# Patient Record
Sex: MALE | Race: WHITE | NOT HISPANIC OR LATINO | Employment: FULL TIME | URBAN - METROPOLITAN AREA
[De-identification: names, ages, dates, MRNs, and addresses within clinical notes are randomized per-mention and may not be internally consistent; named-entity substitution may affect disease eponyms.]

---

## 2019-01-04 ENCOUNTER — OFFICE VISIT (OUTPATIENT)
Dept: URGENT CARE | Facility: CLINIC | Age: 39
End: 2019-01-04
Payer: COMMERCIAL

## 2019-01-04 VITALS
HEART RATE: 100 BPM | BODY MASS INDEX: 27.47 KG/M2 | DIASTOLIC BLOOD PRESSURE: 73 MMHG | RESPIRATION RATE: 16 BRPM | WEIGHT: 175 LBS | TEMPERATURE: 97.6 F | SYSTOLIC BLOOD PRESSURE: 119 MMHG | HEIGHT: 67 IN | OXYGEN SATURATION: 97 %

## 2019-01-04 DIAGNOSIS — J20.9 ACUTE BRONCHITIS, UNSPECIFIED ORGANISM: Primary | ICD-10-CM

## 2019-01-04 PROCEDURE — G0382 LEV 3 HOSP TYPE B ED VISIT: HCPCS | Performed by: EMERGENCY MEDICINE

## 2019-01-04 RX ORDER — PREDNISONE 10 MG/1
TABLET ORAL
Qty: 27 TABLET | Refills: 0 | Status: SHIPPED | OUTPATIENT
Start: 2019-01-04 | End: 2021-10-29

## 2019-01-04 RX ORDER — FLUOXETINE 20 MG/1
20 TABLET, FILM COATED ORAL DAILY
COMMUNITY
End: 2021-10-29

## 2019-01-04 NOTE — LETTER
January 4, 2019     Patient: Arie Ramirez   YOB: 1980   Date of Visit: 1/4/2019       To Whom it May Concern:    Arie Ramirez was seen in my clinic on 1/4/2019  Please excuse him from work on 01/04/19  He may return to work on Monday 01/07/19  If you have any questions or concerns, please don't hesitate to call           Sincerely,          Mahendra Sheppard MD        CC: No Recipients

## 2019-01-04 NOTE — PATIENT INSTRUCTIONS
ingredient - during the day, and the cough suppressant (ex  Robitussin DM or Tessalon) if needed at night only  Take Zinc 12 5 to 15 mg every 2 - 3 hrs while awake for the next few days  You may take Cold Herminio (13 3 mg of Zinc) or split a 25 mg Zinc tablet or lozenge in two or a 50 mg into four to get the proper dose  The total daily dose of Zinc should exceed 75 mg per day  You may also take a decongestant like Sudafed, unless you have hypertension or cardiac disease  Hold any NSAIDs like Ibuprofen (Advil), Naprosyn (Aleve), etc while on steroids like Medrol or Prednisone    Acute Bronchitis   AMBULATORY CARE:   Acute bronchitis  is swelling and irritation in the air passages of your lungs  This irritation may cause you to cough or have other breathing problems  Acute bronchitis often starts because of another illness, such as a cold or the flu  The illness spreads from your nose and throat to your windpipe and airways  Bronchitis is often called a chest cold  Acute bronchitis lasts about 3 to 6 weeks and is usually not a serious illness  Your cough can last for several weeks  You may have any of the following symptoms:   · A cough with sputum that may be clear, yellow, or green    · Feeling more tired than usual, and body aches    · A fever and chills    · Wheezing when you breathe    · A tight chest or pain when you breathe or cough  Seek care immediately if:   · You cough up blood  · Your lips or fingernails turn blue  · You feel like you are not getting enough air when you breathe  Contact your healthcare provider if:   · You have a fever  · Your breathing problems do not go away or get worse  · Your cough does not get better within 4 weeks  · You have questions or concerns about your condition or care  Self-care:   · Get more rest   Rest helps your body to heal  Slowly start to do more each day  Rest when you feel it is needed  · Avoid irritants in the air    Avoid chemicals, fumes, and dust  Wear a face mask if you must work around dust or fumes  Stay inside on days when air pollution levels are high  If you have allergies, stay inside when pollen counts are high  Do not use aerosol products, such as spray-on deodorant, bug spray, and hair spray  · Do not smoke or be around others who smoke  Nicotine and other chemicals in cigarettes and cigars damages the cilia that move mucus out of your lungs  Ask your healthcare provider for information if you currently smoke and need help to quit  E-cigarettes or smokeless tobacco still contain nicotine  Talk to your healthcare provider before you use these products  · Drink liquids as directed  Liquids help keep your air passages moist and help you cough up mucus  You may need to drink more liquids when you have acute bronchitis  Ask how much liquid to drink each day and which liquids are best for you  · Use a humidifier or vaporizer  Use a cool mist humidifier or a vaporizer to increase air moisture in your home  This may make it easier for you to breathe and help decrease your cough  Prevent acute bronchitis by doing the following:   · Get the vaccinations you need  Ask your healthcare provider if you should get vaccinated against the flu or pneumonia  · Prevent the spread of germs  You can decrease your risk of acute bronchitis and other illnesses by doing the following:     [facility] your hands often with soap and water  Carry germ-killing hand lotion or gel with you  You can use the lotion or gel to clean your hands when soap and water are not available  ¨ Do not touch your eyes, nose, or mouth unless you have washed your hands first     ¨ Always cover your mouth when you cough to prevent the spread of germs  It is best to cough into a tissue or your shirt sleeve instead of into your hand  Ask those around you cover their mouths when they cough  ¨ Try to avoid people who have a cold or the flu   If you are sick, stay away from others as much as possible  Medicines: Your healthcare provider may  give you any of the following:  · Ibuprofen or acetaminophen  are medicines that help lower your fever  They are available without a doctor's order  Ask your healthcare provider which medicine is right for you  Ask how much to take and how often to take it  Follow directions  These medicines can cause stomach bleeding if not taken correctly  Ibuprofen can cause kidney damage  Do not take ibuprofen if you have kidney disease, an ulcer, or allergies to aspirin  Acetaminophen can cause liver damage  Do not take more than 4,000 milligrams in 24 hours  · Decongestants  help loosen mucus in your lungs and make it easier to cough up  This can help you breathe easier  · Cough suppressants  decrease your urge to cough  If your cough produces mucus, do not take a cough suppressant unless your healthcare provider tells you to  Your healthcare provider may suggest that you take a cough suppressant at night so you can rest     · Inhalers  may be given  Your healthcare provider may give you one or more inhalers to help you breathe easier and cough less  An inhaler gives your medicine to open your airways  Ask your healthcare provider to show you how to use your inhaler correctly  Follow up with your healthcare provider as directed:  Write down questions you have so you will remember to ask them during your follow-up visits  © 2017 [address]  Information is for End User's use only and may not be sold, redistributed or otherwise used for commercial purposes  All illustrations and images included in CareNotes® are the copyrighted property of A D A M , Inc  or Duane Henao  The above information is an  only  It is not intended as medical advice for individual conditions or treatments   Talk to your doctor, nurse or pharmacist before following any medical regimen to see if it is safe and effective for you

## 2019-01-04 NOTE — PROGRESS NOTES
[facility]        NAME: Mag Anderson is a 34 y o  male  : 1980    MRN: [id number]  DATE: 2019  TIME: 4:46 PM    Assessment and Plan   Acute bronchitis, unspecified organism [J20 9]  1  Acute bronchitis, unspecified organism  predniSONE 10 mg tablet         Patient Instructions     Patient Instructions     ingredient - during the day, and the cough suppressant (ex  Robitussin DM or Tessalon) if needed at night only  Take Zinc 12 5 to 15 mg every 2 - 3 hrs while awake for the next few days  You may take Cold Herminio (13 3 mg of Zinc) or split a 25 mg Zinc tablet or lozenge in two or a 50 mg into four to get the proper dose  The total daily dose of Zinc should exceed 75 mg per day  You may also take a decongestant like Sudafed, unless you have hypertension or cardiac disease  Hold any NSAIDs like Ibuprofen (Advil), Naprosyn (Aleve), etc while on steroids like Medrol or Prednisone    Acute Bronchitis   AMBULATORY CARE:   Acute bronchitis  is swelling and irritation in the air passages of your lungs  This irritation may cause you to cough or have other breathing problems  Acute bronchitis often starts because of another illness, such as a cold or the flu  The illness spreads from your nose and throat to your windpipe and airways  Bronchitis is often called a chest cold  Acute bronchitis lasts about 3 to 6 weeks and is usually not a serious illness  Your cough can last for several weeks  You may have any of the following symptoms:   · A cough with sputum that may be clear, yellow, or green    · Feeling more tired than usual, and body aches    · A fever and chills    · Wheezing when you breathe    · A tight chest or pain when you breathe or cough  Seek care immediately if:   · You cough up blood  · Your lips or fingernails turn blue  · You feel like you are not getting enough air when you breathe  Contact your healthcare provider if:   · You have a fever      · Your breathing problems do not go away or get worse  · Your cough does not get better within 4 weeks  · You have questions or concerns about your condition or care  Self-care:   · Get more rest   Rest helps your body to heal  Slowly start to do more each day  Rest when you feel it is needed  · Avoid irritants in the air  Avoid chemicals, fumes, and dust  Wear a face mask if you must work around dust or fumes  Stay inside on days when air pollution levels are high  If you have allergies, stay inside when pollen counts are high  Do not use aerosol products, such as spray-on deodorant, bug spray, and hair spray  · Do not smoke or be around others who smoke  Nicotine and other chemicals in cigarettes and cigars damages the cilia that move mucus out of your lungs  Ask your healthcare provider for information if you currently smoke and need help to quit  E-cigarettes or smokeless tobacco still contain nicotine  Talk to your healthcare provider before you use these products  · Drink liquids as directed  Liquids help keep your air passages moist and help you cough up mucus  You may need to drink more liquids when you have acute bronchitis  Ask how much liquid to drink each day and which liquids are best for you  · Use a humidifier or vaporizer  Use a cool mist humidifier or a vaporizer to increase air moisture in your home  This may make it easier for you to breathe and help decrease your cough  Prevent acute bronchitis by doing the following:   · Get the vaccinations you need  Ask your healthcare provider if you should get vaccinated against the flu or pneumonia  · Prevent the spread of germs  You can decrease your risk of acute bronchitis and other illnesses by doing the following:     [facility] your hands often with soap and water  Carry germ-killing hand lotion or gel with you  You can use the lotion or gel to clean your hands when soap and water are not available      ¨ Do not touch your eyes, nose, or mouth unless you have washed your hands first     ¨ Always cover your mouth when you cough to prevent the spread of germs  It is best to cough into a tissue or your shirt sleeve instead of into your hand  Ask those around you cover their mouths when they cough  ¨ Try to avoid people who have a cold or the flu  If you are sick, stay away from others as much as possible  Medicines: Your healthcare provider may  give you any of the following:  · Ibuprofen or acetaminophen  are medicines that help lower your fever  They are available without a doctor's order  Ask your healthcare provider which medicine is right for you  Ask how much to take and how often to take it  Follow directions  These medicines can cause stomach bleeding if not taken correctly  Ibuprofen can cause kidney damage  Do not take ibuprofen if you have kidney disease, an ulcer, or allergies to aspirin  Acetaminophen can cause liver damage  Do not take more than 4,000 milligrams in 24 hours  · Decongestants  help loosen mucus in your lungs and make it easier to cough up  This can help you breathe easier  · Cough suppressants  decrease your urge to cough  If your cough produces mucus, do not take a cough suppressant unless your healthcare provider tells you to  Your healthcare provider may suggest that you take a cough suppressant at night so you can rest     · Inhalers  may be given  Your healthcare provider may give you one or more inhalers to help you breathe easier and cough less  An inhaler gives your medicine to open your airways  Ask your healthcare provider to show you how to use your inhaler correctly  Follow up with your healthcare provider as directed:  Write down questions you have so you will remember to ask them during your follow-up visits  © 2017 [address] [employer]  Information is for End User's use only and may not be sold, redistributed or otherwise used for commercial purposes   All illustrations and images included in [facility] are the copyrighted property of A D A M , Inc  or Duane Henao  The above information is an  only  It is not intended as medical advice for individual conditions or treatments  Talk to your doctor, nurse or pharmacist before following any medical regimen to see if it is safe and effective for you  Follow up with PCP in 3-5 days  Proceed to  ER if symptoms worsen  Chief Complaint     Chief Complaint   Patient presents with    Chest Congestion     pt states he feels like he has fluid in his chest x several weeks  History of Present Illness        Patient complains of cough and congestion for the past couple of weeks  He denies fever or chills  He denies wheezing or shortness of breath  Review of Systems   Review of Systems   HENT: Positive for congestion  Negative for ear discharge, ear pain, hearing loss, rhinorrhea and sinus pressure  Respiratory: Positive for cough  Negative for shortness of breath and wheezing  Gastrointestinal: Negative for diarrhea and vomiting  Musculoskeletal: Negative for neck stiffness  Skin: Negative for pallor  Neurological: Negative for headaches           Current Medications       Current Outpatient Prescriptions:     FLUoxetine (PROzac) 20 MG tablet, Take 20 mg by mouth daily, Disp: , Rfl:     predniSONE 10 mg tablet, Take once daily all days pills on this schedule 6- 6- 5- 4- 3- 2- 1, Disp: 27 tablet, Rfl: 0    Current Allergies     Allergies as of 01/04/2019    (No Known Allergies)            The following portions of the patient's history were reviewed and updated as appropriate: allergies, current medications, past family history, past medical history, past social history, past surgical history and problem list      Past Medical History:   Diagnosis Date    Depression        Past Surgical History:   Procedure Laterality Date    BACK SURGERY         Family History   Problem Relation Age of Onset  No Known Problems Mother     No Known Problems Father          Medications have been verified  Objective   /73 (BP Location: Left arm, Patient Position: Sitting, Cuff Size: Large)   Pulse 100   Temp 97 6 °F (36 4 °C) (Tympanic)   Resp 16   Ht 5' 7" (1 702 m)   Wt 79 4 kg (175 lb)   SpO2 97%   BMI 27 41 kg/m²        Physical Exam     Physical Exam   Constitutional: He is oriented to person, place, and time  He appears well-developed and well-nourished  No distress  HENT:   Head: Normocephalic and atraumatic  Right Ear: Tympanic membrane, external ear and ear canal normal    Left Ear: Tympanic membrane, external ear and ear canal normal    Nose: Mucosal edema and rhinorrhea present  Mouth/Throat: Posterior oropharyngeal erythema present  Eyes: Pupils are equal, round, and reactive to light  Conjunctivae are normal    Neck: Neck supple  Cardiovascular: Normal rate, regular rhythm and normal heart sounds  Pulmonary/Chest: Effort normal      Rhonchi left upper lobe no rales  Abdominal: Soft  Bowel sounds are normal    Musculoskeletal: Normal range of motion  Neurological: He is alert and oriented to person, place, and time  Skin: Skin is warm and dry  He is not diaphoretic  No pallor  Psychiatric: He has a normal mood and affect  Nursing note and vitals reviewed

## 2019-01-07 ENCOUNTER — OFFICE VISIT (OUTPATIENT)
Dept: URGENT CARE | Facility: CLINIC | Age: 39
End: 2019-01-07
Payer: COMMERCIAL

## 2019-01-07 VITALS
RESPIRATION RATE: 16 BRPM | HEIGHT: 67 IN | TEMPERATURE: 97.5 F | HEART RATE: 84 BPM | SYSTOLIC BLOOD PRESSURE: 120 MMHG | DIASTOLIC BLOOD PRESSURE: 69 MMHG | WEIGHT: 175 LBS | OXYGEN SATURATION: 99 % | BODY MASS INDEX: 27.47 KG/M2

## 2019-01-07 DIAGNOSIS — J20.9 ACUTE BRONCHITIS, UNSPECIFIED ORGANISM: Primary | ICD-10-CM

## 2019-01-07 DIAGNOSIS — T88.7XXA MEDICATION SIDE EFFECT: ICD-10-CM

## 2019-01-07 PROCEDURE — G0382 LEV 3 HOSP TYPE B ED VISIT: HCPCS | Performed by: EMERGENCY MEDICINE

## 2019-01-07 RX ORDER — FLUOXETINE HYDROCHLORIDE 20 MG/1
20 CAPSULE ORAL DAILY
Refills: 5 | COMMUNITY
Start: 2018-11-29

## 2019-01-07 NOTE — PATIENT INSTRUCTIONS
You should stop the prednisone as discussed as your bronchitis is improving but you are having side effects from the prednisone  If your breathing symptoms worsen consider restarting the prednisone at half the dose that was prescribed  Safe Use of Antiplatelet Medication, Ambulatory Care   Davon P: Antiplatelet agents in clinical practice and their haemorrhagic risk  Blood Transfus 2013; 11(3):349-356  Talib RE: Drugs that affect platelet function  Karma Thomas Hemost 2012; 50(3):712-382  Mary Duff, Eliana HW, Isabel YH, et al: A review of potential harmful interactions between anticoagulant/antiplatelet agents and Chinese herbal medicines  PLoS One 2013; 4(5):T00034  Ancil Peeling: Hemostasis, Thrombosis, Fibrinolysis, and Cardiovascular Disease  In: Natalio RO, Arie DL, Tayla DP, et al, eds  [place]'s Heart Disease - A Textbook of Cardiovascular Medicine, 9th ed  [address], [place], [place], 2012 © 2014 [address] is for End User's use only and may not be sold, redistributed or otherwise used for commercial purposes  All illustrations and images included in CareNotes® are the copyrighted property of A D A M , Inc  or Duane Henao  The above information is an  only  It is not intended as medical advice for individual conditions or treatments  Talk to your doctor, nurse or pharmacist before following any medical regimen to see if it is safe and effective for you

## 2019-01-07 NOTE — PROGRESS NOTES
[facility]        NAME: Charanjit Sampson is a 34 y o  male  : 1980    MRN: [id number]  DATE: 2019  TIME: 1:10 PM    Assessment and Plan   Acute bronchitis, unspecified organism [J20 9]  1  Acute bronchitis, unspecified organism     2  Medication side effect           Patient Instructions     Patient Instructions   You should stop the prednisone as discussed as your bronchitis is improving but you are having side effects from the prednisone  If your breathing symptoms worsen consider restarting the prednisone at half the dose that was prescribed  Safe Use of Antiplatelet Medication, Ambulatory Care   Davon P: Antiplatelet agents in clinical practice and their haemorrhagic risk  Blood Transfus 2013; 11(3):349-356  Talib RE: Drugs that affect platelet function  Andriette Constable Hemost 2012; 93(6):142-404  Jesse Martinez, Eliana HW, Isabel YH, et al: A review of potential harmful interactions between anticoagulant/antiplatelet agents and Chinese herbal medicines  PLoS One 2013; 0(5):A62751  Ilana Flaming: Hemostasis, Thrombosis, Fibrinolysis, and Cardiovascular Disease  In: Natalio RO, Arie DL, Tayla DP, et al, eds  [place]'s Heart Disease - A Textbook of Cardiovascular Medicine, 9th ed  [address] [facility], [place], [place], 2012  ©  [address] is for End User's use only and may not be sold, redistributed or otherwise used for commercial purposes  All illustrations and images included in CareNotes® are the copyrighted property of A D A M , Inc  or Duane Henao  The above information is an  only  It is not intended as medical advice for individual conditions or treatments  Talk to your doctor, nurse or pharmacist before following any medical regimen to see if it is safe and effective for you  Follow up with PCP in 3-5 days  Proceed to  ER if symptoms worsen      Chief Complaint     Chief Complaint   Patient presents with    Palpitations Pt treated with steroids for bronchitis 01/04/2019  Evaluated here at Urgent Care  Pt's bronchitis sx are improving, however he is feeling shaky and c/o heart palpitations since he began taking the steroids prescribed  History of Present Illness       Patient seen here 4 days ago, diagnosed with viral bronchitis treated with prednisone taper  He claims symptomatic improvement in his breathing but claims he is jittery for many hours after each dose of prednisone  He has a long history of anxiety  Review of Systems   Review of Systems   Constitutional: Negative for chills and fever  HENT: Positive for congestion and rhinorrhea  Negative for ear discharge, ear pain, hearing loss and sinus pressure  Respiratory: Positive for cough  Negative for chest tightness, shortness of breath and wheezing  Gastrointestinal: Negative for diarrhea and vomiting  Musculoskeletal: Negative for neck stiffness  Skin: Negative for pallor  Neurological: Negative for headaches           Current Medications       Current Outpatient Prescriptions:     FLUoxetine (PROzac) 20 mg capsule, Take 20 mg by mouth daily, Disp: , Rfl: 5    FLUoxetine (PROzac) 20 MG tablet, Take 20 mg by mouth daily, Disp: , Rfl:     predniSONE 10 mg tablet, Take once daily all days pills on this schedule 6- 6- 5- 4- 3- 2- 1, Disp: 27 tablet, Rfl: 0    Current Allergies     Allergies as of 01/07/2019    (No Known Allergies)            The following portions of the patient's history were reviewed and updated as appropriate: allergies, current medications, past family history, past medical history, past social history, past surgical history and problem list      Past Medical History:   Diagnosis Date    Depression        Past Surgical History:   Procedure Laterality Date    BACK SURGERY         Family History   Problem Relation Age of Onset    No Known Problems Mother     No Known Problems Father          Medications have been verified  Objective   /69   Pulse 84   Temp 97 5 °F (36 4 °C)   Resp 16   Ht 5' 7" (1 702 m)   Wt 79 4 kg (175 lb)   SpO2 99%   BMI 27 41 kg/m²        Physical Exam     Physical Exam   Constitutional: He is oriented to person, place, and time  He appears well-developed and well-nourished  No distress  HENT:   Head: Normocephalic and atraumatic  Right Ear: Tympanic membrane, external ear and ear canal normal    Left Ear: Tympanic membrane, external ear and ear canal normal    Nose: Mucosal edema and rhinorrhea present  Mouth/Throat: Posterior oropharyngeal erythema present  Eyes: Pupils are equal, round, and reactive to light  Conjunctivae are normal    Neck: Neck supple  Cardiovascular: Normal rate, regular rhythm and normal heart sounds  Pulmonary/Chest: Effort normal and breath sounds normal    Abdominal: Soft  Bowel sounds are normal    Musculoskeletal: Normal range of motion  Neurological: He is alert and oriented to person, place, and time  Skin: Skin is warm and dry  He is not diaphoretic  No pallor  Psychiatric: He has a normal mood and affect  His behavior is normal  Judgment and thought content normal    Nursing note and vitals reviewed

## 2019-01-07 NOTE — LETTER
January 7, 2019     Patient: Shannan Dinh   YOB: 1980   Date of Visit: 1/7/2019       To Whom it May Concern:    Wily Campos was seen in my clinic on 1/7/2019  He can return to work 01/08/2019  If you have any questions or concerns, please don't hesitate to call           Sincerely,          Migdalia Rivero MD        CC: No Recipients

## 2019-01-23 ENCOUNTER — RX ONLY (RX ONLY)
Age: 39
End: 2019-01-23

## 2019-01-23 ENCOUNTER — DOCTOR'S OFFICE (OUTPATIENT)
Dept: URBAN - NONMETROPOLITAN AREA CLINIC 1 | Facility: CLINIC | Age: 39
Setting detail: OPHTHALMOLOGY
End: 2019-01-23
Payer: COMMERCIAL

## 2019-01-23 DIAGNOSIS — H15.032: ICD-10-CM

## 2019-01-23 PROCEDURE — 99203 OFFICE O/P NEW LOW 30 MIN: CPT | Performed by: OPHTHALMOLOGY

## 2019-01-23 ASSESSMENT — REFRACTION_MANIFEST
OU_VA: 20/
OS_VA3: 20/
OS_VA2: 20/
OD_VA2: 20/
OU_VA: 20/
OD_VA3: 20/
OS_VA3: 20/
OS_VA2: 20/
OS_VA1: 20/
OD_VA1: 20/
OD_VA3: 20/
OS_VA1: 20/
OD_VA2: 20/
OD_VA1: 20/

## 2019-01-23 ASSESSMENT — REFRACTION_CURRENTRX
OS_OVR_VA: 20/
OD_OVR_VA: 20/
OS_OVR_VA: 20/
OD_OVR_VA: 20/
OD_OVR_VA: 20/
OS_OVR_VA: 20/

## 2019-01-23 ASSESSMENT — VISUAL ACUITY
OD_BCVA: 20/20-1
OS_BCVA: 20/25-1

## 2019-01-23 ASSESSMENT — CONFRONTATIONAL VISUAL FIELD TEST (CVF)
OD_FINDINGS: FULL
OS_FINDINGS: FULL

## 2019-01-23 ASSESSMENT — REFRACTION_AUTOREFRACTION
OS_AXIS: 180
OS_CYLINDER: 0.00
OD_SPHERE: +0.50
OS_SPHERE: +0.25
OD_AXIS: 084
OD_CYLINDER: -0.50

## 2019-01-23 ASSESSMENT — SPHEQUIV_DERIVED
OD_SPHEQUIV: 0.25
OS_SPHEQUIV: 0.25

## 2019-02-12 ENCOUNTER — OFFICE VISIT (OUTPATIENT)
Dept: URGENT CARE | Facility: CLINIC | Age: 39
End: 2019-02-12
Payer: COMMERCIAL

## 2019-02-12 VITALS
WEIGHT: 175 LBS | RESPIRATION RATE: 16 BRPM | HEART RATE: 86 BPM | HEIGHT: 67 IN | BODY MASS INDEX: 27.47 KG/M2 | SYSTOLIC BLOOD PRESSURE: 111 MMHG | OXYGEN SATURATION: 98 % | DIASTOLIC BLOOD PRESSURE: 68 MMHG | TEMPERATURE: 98 F

## 2019-02-12 DIAGNOSIS — H57.12 LEFT EYE PAIN: Primary | ICD-10-CM

## 2019-02-12 PROCEDURE — G0383 LEV 4 HOSP TYPE B ED VISIT: HCPCS

## 2019-02-12 RX ORDER — ACETAMINOPHEN 500 MG
1000 TABLET ORAL EVERY 8 HOURS PRN
Qty: 60 TABLET | Refills: 0 | Status: SHIPPED | OUTPATIENT
Start: 2019-02-12 | End: 2021-10-29

## 2019-02-12 RX ORDER — TIZANIDINE HYDROCHLORIDE 2 MG/1
2 CAPSULE, GELATIN COATED ORAL
COMMUNITY
Start: 2018-05-11 | End: 2019-02-12

## 2019-02-12 RX ORDER — TOBRAMYCIN AND DEXAMETHASONE 3; 1 MG/ML; MG/ML
SUSPENSION/ DROPS OPHTHALMIC
Refills: 3 | COMMUNITY
Start: 2019-01-23 | End: 2021-10-29

## 2019-02-12 RX ORDER — METHYLPREDNISOLONE 4 MG/1
TABLET ORAL
COMMUNITY
Start: 2019-02-08 | End: 2019-02-12

## 2019-02-12 RX ORDER — CYCLOBENZAPRINE HCL 5 MG
5 TABLET ORAL
COMMUNITY
Start: 2017-03-28 | End: 2021-10-29

## 2019-02-12 RX ORDER — NAPROXEN 500 MG/1
500 TABLET ORAL 2 TIMES DAILY WITH MEALS
Qty: 30 TABLET | Refills: 0 | Status: SHIPPED | OUTPATIENT
Start: 2019-02-12 | End: 2021-10-29

## 2019-02-12 RX ORDER — ALBUTEROL SULFATE 90 UG/1
2 AEROSOL, METERED RESPIRATORY (INHALATION)
COMMUNITY
Start: 2019-02-08

## 2019-02-12 NOTE — LETTER
February 12, 2019     Patient: Governor Blount   YOB: 1980   Date of Visit: 2/12/2019       To Whom it May Concern:    Luan Perez was seen in my clinic on 2/12/2019  He may return to work on 2/13/2019  If you have any questions or concerns, please don't hesitate to call           Sincerely,          JOSHUA BIGGS        CC: No Recipients

## 2019-02-12 NOTE — PROGRESS NOTES
[facility]        NAME: Lela Daley is a 34 y o  male  : 1980    MRN: [id number]  DATE: 2019  TIME: 10:10 AM    Assessment and Plan   Left eye pain [H57 12]  1  Left eye pain  naproxen (EC NAPROSYN) 500 MG EC tablet    acetaminophen (TYLENOL) 500 mg tablet     Follow up with Ophthalmology       Patient Instructions       Follow up with PCP in 3-5 days  Proceed to  ER if symptoms worsen  Chief Complaint     Chief Complaint   Patient presents with    Eye Pain     left eye pain since January         History of Present Illness       52-year-old male comes in with left eye pain  He states that on  he had an episode with severe left-sided eye pain and then subsequently had a fall  He was seen in the emergency department where CT scan was done then followed up with Ophthalmology with Dr Janet Erwin  He was prescribed eyedrops  He states that he has persistent eye pain  Pain is worse with the eye movement and better with rest   Denies any visual changes  Has taken occasional tylenol which does not work          Review of Systems   Review of Systems  As above     Current Medications       Current Outpatient Medications:     albuterol (PROAIR HFA) 90 mcg/act inhaler, Inhale 2 puffs, Disp: , Rfl:     cyclobenzaprine (FLEXERIL) 5 mg tablet, Take 5 mg by mouth, Disp: , Rfl:     FLUoxetine (PROzac) 20 MG tablet, Take 20 mg by mouth daily, Disp: , Rfl:     tobramycin-dexamethasone (TOBRADEX) ophthalmic suspension, , Disp: , Rfl: 3    acetaminophen (TYLENOL) 500 mg tablet, Take 2 tablets (1,000 mg total) by mouth every 8 (eight) hours as needed for mild pain, Disp: 60 tablet, Rfl: 0    FLUoxetine (PROzac) 20 mg capsule, Take 20 mg by mouth daily, Disp: , Rfl: 5    naproxen (EC NAPROSYN) 500 MG EC tablet, Take 1 tablet (500 mg total) by mouth 2 (two) times a day with meals, Disp: 30 tablet, Rfl: 0    predniSONE 10 mg tablet, Take once daily all days pills on this schedule 6- 6- 5- 4- 3- 2- 1 (Patient not taking: Reported on 2/12/2019), Disp: 27 tablet, Rfl: 0    Current Allergies     Allergies as of 02/12/2019    (No Known Allergies)            The following portions of the patient's history were reviewed and updated as appropriate: allergies, current medications, past family history, past medical history, past social history, past surgical history and problem list      Past Medical History:   Diagnosis Date    Depression     Head injury        Past Surgical History:   Procedure Laterality Date    BACK SURGERY         Family History   Problem Relation Age of Onset    No Known Problems Mother     No Known Problems Father          Medications have been verified  Objective   /68   Pulse 86   Temp 98 °F (36 7 °C)   Resp 16   Ht 5' 7" (1 702 m)   Wt 79 4 kg (175 lb)   SpO2 98%   BMI 27 41 kg/m²        Physical Exam     Physical Exam   Constitutional: He is oriented to person, place, and time  He appears well-developed and well-nourished  HENT:   Head: Normocephalic and atraumatic  Eyes: Pupils are equal, round, and reactive to light  Conjunctivae and EOM are normal  Right eye exhibits no discharge  Left eye exhibits no discharge  No scleral icterus  Neck: Neck supple  Cardiovascular: Normal rate  Pulmonary/Chest: Effort normal  No respiratory distress  Abdominal: Soft  He exhibits no distension  Musculoskeletal: Normal range of motion  Neurological: He is alert and oriented to person, place, and time  Skin: Skin is warm and dry  No rash noted  Psychiatric: He has a normal mood and affect   His behavior is normal  Judgment and thought content normal

## 2019-04-15 ENCOUNTER — APPOINTMENT (OUTPATIENT)
Dept: URGENT CARE | Facility: CLINIC | Age: 39
End: 2019-04-15
Payer: OTHER MISCELLANEOUS

## 2019-04-15 ENCOUNTER — APPOINTMENT (OUTPATIENT)
Dept: RADIOLOGY | Facility: CLINIC | Age: 39
End: 2019-04-15
Payer: OTHER MISCELLANEOUS

## 2019-04-15 DIAGNOSIS — M54.9 BACK PAIN, UNSPECIFIED BACK LOCATION, UNSPECIFIED BACK PAIN LATERALITY, UNSPECIFIED CHRONICITY: Primary | ICD-10-CM

## 2019-04-15 DIAGNOSIS — M54.9 BACK PAIN, UNSPECIFIED BACK LOCATION, UNSPECIFIED BACK PAIN LATERALITY, UNSPECIFIED CHRONICITY: ICD-10-CM

## 2019-04-15 PROCEDURE — 99283 EMERGENCY DEPT VISIT LOW MDM: CPT | Performed by: PHYSICIAN ASSISTANT

## 2019-04-15 PROCEDURE — 72110 X-RAY EXAM L-2 SPINE 4/>VWS: CPT

## 2019-04-15 PROCEDURE — G0382 LEV 3 HOSP TYPE B ED VISIT: HCPCS | Performed by: PHYSICIAN ASSISTANT

## 2019-04-16 ENCOUNTER — EVALUATION (OUTPATIENT)
Dept: PHYSICAL THERAPY | Facility: CLINIC | Age: 39
End: 2019-04-16
Payer: COMMERCIAL

## 2019-04-16 DIAGNOSIS — S33.6XXA SPRAIN OF SACROILIAC LIGAMENT, INITIAL ENCOUNTER: ICD-10-CM

## 2019-04-16 DIAGNOSIS — M54.50 ACUTE LEFT-SIDED LOW BACK PAIN WITHOUT SCIATICA: Primary | ICD-10-CM

## 2019-04-16 PROCEDURE — 97014 ELECTRIC STIMULATION THERAPY: CPT | Performed by: PHYSICAL THERAPIST

## 2019-04-16 PROCEDURE — 97110 THERAPEUTIC EXERCISES: CPT | Performed by: PHYSICAL THERAPIST

## 2019-04-16 PROCEDURE — 97162 PT EVAL MOD COMPLEX 30 MIN: CPT | Performed by: PHYSICAL THERAPIST

## 2019-04-18 ENCOUNTER — TRANSCRIBE ORDERS (OUTPATIENT)
Dept: PHYSICAL THERAPY | Facility: CLINIC | Age: 39
End: 2019-04-18

## 2019-04-18 ENCOUNTER — OFFICE VISIT (OUTPATIENT)
Dept: PHYSICAL THERAPY | Facility: CLINIC | Age: 39
End: 2019-04-18
Payer: COMMERCIAL

## 2019-04-18 DIAGNOSIS — S33.6XXA SPRAIN OF SACROILIAC LIGAMENT, INITIAL ENCOUNTER: ICD-10-CM

## 2019-04-18 DIAGNOSIS — M54.50 ACUTE LEFT-SIDED LOW BACK PAIN WITHOUT SCIATICA: Primary | ICD-10-CM

## 2019-04-18 DIAGNOSIS — M54.5 LOW BACK PAIN, UNSPECIFIED BACK PAIN LATERALITY, UNSPECIFIED CHRONICITY, WITH SCIATICA PRESENCE UNSPECIFIED: Primary | ICD-10-CM

## 2019-04-18 PROCEDURE — 97014 ELECTRIC STIMULATION THERAPY: CPT

## 2019-04-18 PROCEDURE — 97140 MANUAL THERAPY 1/> REGIONS: CPT

## 2019-04-18 PROCEDURE — 97110 THERAPEUTIC EXERCISES: CPT

## 2019-04-22 ENCOUNTER — OFFICE VISIT (OUTPATIENT)
Dept: PHYSICAL THERAPY | Facility: CLINIC | Age: 39
End: 2019-04-22
Payer: COMMERCIAL

## 2019-04-22 DIAGNOSIS — S33.6XXA SPRAIN OF SACROILIAC LIGAMENT, INITIAL ENCOUNTER: ICD-10-CM

## 2019-04-22 DIAGNOSIS — M54.50 ACUTE LEFT-SIDED LOW BACK PAIN WITHOUT SCIATICA: Primary | ICD-10-CM

## 2019-04-22 PROCEDURE — 97014 ELECTRIC STIMULATION THERAPY: CPT | Performed by: PHYSICAL THERAPIST

## 2019-04-22 PROCEDURE — 97140 MANUAL THERAPY 1/> REGIONS: CPT | Performed by: PHYSICAL THERAPIST

## 2019-04-22 PROCEDURE — 97110 THERAPEUTIC EXERCISES: CPT | Performed by: PHYSICAL THERAPIST

## 2019-04-23 ENCOUNTER — OFFICE VISIT (OUTPATIENT)
Dept: PHYSICAL THERAPY | Facility: CLINIC | Age: 39
End: 2019-04-23
Payer: COMMERCIAL

## 2019-04-23 DIAGNOSIS — M54.50 ACUTE LEFT-SIDED LOW BACK PAIN WITHOUT SCIATICA: Primary | ICD-10-CM

## 2019-04-23 DIAGNOSIS — S33.6XXA SPRAIN OF SACROILIAC LIGAMENT, INITIAL ENCOUNTER: ICD-10-CM

## 2019-04-23 PROCEDURE — 97110 THERAPEUTIC EXERCISES: CPT | Performed by: PHYSICAL THERAPIST

## 2019-04-23 PROCEDURE — 97140 MANUAL THERAPY 1/> REGIONS: CPT | Performed by: PHYSICAL THERAPIST

## 2019-04-23 PROCEDURE — 97014 ELECTRIC STIMULATION THERAPY: CPT | Performed by: PHYSICAL THERAPIST

## 2019-04-25 ENCOUNTER — OFFICE VISIT (OUTPATIENT)
Dept: PHYSICAL THERAPY | Facility: CLINIC | Age: 39
End: 2019-04-25
Payer: COMMERCIAL

## 2019-04-25 DIAGNOSIS — M54.50 ACUTE LEFT-SIDED LOW BACK PAIN WITHOUT SCIATICA: Primary | ICD-10-CM

## 2019-04-25 DIAGNOSIS — S33.6XXA SPRAIN OF SACROILIAC LIGAMENT, INITIAL ENCOUNTER: ICD-10-CM

## 2019-04-25 PROCEDURE — 97140 MANUAL THERAPY 1/> REGIONS: CPT | Performed by: PHYSICAL THERAPIST

## 2019-04-25 PROCEDURE — 97110 THERAPEUTIC EXERCISES: CPT | Performed by: PHYSICAL THERAPIST

## 2019-04-25 PROCEDURE — 97014 ELECTRIC STIMULATION THERAPY: CPT | Performed by: PHYSICAL THERAPIST

## 2019-04-29 ENCOUNTER — OFFICE VISIT (OUTPATIENT)
Dept: PHYSICAL THERAPY | Facility: CLINIC | Age: 39
End: 2019-04-29
Payer: COMMERCIAL

## 2019-04-29 DIAGNOSIS — M54.50 ACUTE LEFT-SIDED LOW BACK PAIN WITHOUT SCIATICA: Primary | ICD-10-CM

## 2019-04-29 DIAGNOSIS — S33.6XXA SPRAIN OF SACROILIAC LIGAMENT, INITIAL ENCOUNTER: ICD-10-CM

## 2019-04-29 PROCEDURE — 97140 MANUAL THERAPY 1/> REGIONS: CPT

## 2019-04-29 PROCEDURE — 97110 THERAPEUTIC EXERCISES: CPT

## 2019-04-29 PROCEDURE — 97014 ELECTRIC STIMULATION THERAPY: CPT

## 2019-04-30 ENCOUNTER — OFFICE VISIT (OUTPATIENT)
Dept: PHYSICAL THERAPY | Facility: CLINIC | Age: 39
End: 2019-04-30
Payer: COMMERCIAL

## 2019-04-30 DIAGNOSIS — M54.50 ACUTE LEFT-SIDED LOW BACK PAIN WITHOUT SCIATICA: Primary | ICD-10-CM

## 2019-04-30 DIAGNOSIS — S33.6XXA SPRAIN OF SACROILIAC LIGAMENT, INITIAL ENCOUNTER: ICD-10-CM

## 2019-04-30 PROCEDURE — 97140 MANUAL THERAPY 1/> REGIONS: CPT

## 2019-04-30 PROCEDURE — 97110 THERAPEUTIC EXERCISES: CPT

## 2019-04-30 PROCEDURE — 97014 ELECTRIC STIMULATION THERAPY: CPT

## 2019-05-02 ENCOUNTER — OFFICE VISIT (OUTPATIENT)
Dept: PHYSICAL THERAPY | Facility: CLINIC | Age: 39
End: 2019-05-02
Payer: COMMERCIAL

## 2019-05-02 DIAGNOSIS — S33.6XXA SPRAIN OF SACROILIAC LIGAMENT, INITIAL ENCOUNTER: ICD-10-CM

## 2019-05-02 DIAGNOSIS — M54.50 ACUTE LEFT-SIDED LOW BACK PAIN WITHOUT SCIATICA: Primary | ICD-10-CM

## 2019-05-02 PROCEDURE — 97140 MANUAL THERAPY 1/> REGIONS: CPT

## 2019-05-02 PROCEDURE — 97110 THERAPEUTIC EXERCISES: CPT

## 2019-05-02 PROCEDURE — 97014 ELECTRIC STIMULATION THERAPY: CPT

## 2019-05-06 ENCOUNTER — OFFICE VISIT (OUTPATIENT)
Dept: PHYSICAL THERAPY | Facility: CLINIC | Age: 39
End: 2019-05-06
Payer: COMMERCIAL

## 2019-05-06 DIAGNOSIS — S33.6XXA SPRAIN OF SACROILIAC LIGAMENT, INITIAL ENCOUNTER: ICD-10-CM

## 2019-05-06 DIAGNOSIS — M54.50 ACUTE LEFT-SIDED LOW BACK PAIN WITHOUT SCIATICA: Primary | ICD-10-CM

## 2019-05-06 PROCEDURE — 97140 MANUAL THERAPY 1/> REGIONS: CPT

## 2019-05-06 PROCEDURE — 97110 THERAPEUTIC EXERCISES: CPT

## 2019-05-06 PROCEDURE — 97014 ELECTRIC STIMULATION THERAPY: CPT

## 2019-05-07 ENCOUNTER — OFFICE VISIT (OUTPATIENT)
Dept: PHYSICAL THERAPY | Facility: CLINIC | Age: 39
End: 2019-05-07
Payer: COMMERCIAL

## 2019-05-07 DIAGNOSIS — M54.50 ACUTE LEFT-SIDED LOW BACK PAIN WITHOUT SCIATICA: Primary | ICD-10-CM

## 2019-05-07 DIAGNOSIS — S33.6XXA SPRAIN OF SACROILIAC LIGAMENT, INITIAL ENCOUNTER: ICD-10-CM

## 2019-05-07 PROCEDURE — 97140 MANUAL THERAPY 1/> REGIONS: CPT

## 2019-05-07 PROCEDURE — 97110 THERAPEUTIC EXERCISES: CPT

## 2019-05-09 ENCOUNTER — APPOINTMENT (OUTPATIENT)
Dept: PHYSICAL THERAPY | Facility: CLINIC | Age: 39
End: 2019-05-09
Payer: COMMERCIAL

## 2019-05-13 ENCOUNTER — OFFICE VISIT (OUTPATIENT)
Dept: PHYSICAL THERAPY | Facility: CLINIC | Age: 39
End: 2019-05-13
Payer: COMMERCIAL

## 2019-05-13 DIAGNOSIS — S33.6XXA SPRAIN OF SACROILIAC LIGAMENT, INITIAL ENCOUNTER: ICD-10-CM

## 2019-05-13 DIAGNOSIS — M54.50 ACUTE LEFT-SIDED LOW BACK PAIN WITHOUT SCIATICA: Primary | ICD-10-CM

## 2019-05-13 PROCEDURE — 97110 THERAPEUTIC EXERCISES: CPT

## 2019-05-13 PROCEDURE — 97140 MANUAL THERAPY 1/> REGIONS: CPT

## 2019-05-13 PROCEDURE — 97014 ELECTRIC STIMULATION THERAPY: CPT

## 2019-05-14 ENCOUNTER — APPOINTMENT (OUTPATIENT)
Dept: PHYSICAL THERAPY | Facility: CLINIC | Age: 39
End: 2019-05-14
Payer: COMMERCIAL

## 2019-05-16 ENCOUNTER — OFFICE VISIT (OUTPATIENT)
Dept: PHYSICAL THERAPY | Facility: CLINIC | Age: 39
End: 2019-05-16
Payer: COMMERCIAL

## 2019-05-16 DIAGNOSIS — S33.6XXA SPRAIN OF SACROILIAC LIGAMENT, INITIAL ENCOUNTER: ICD-10-CM

## 2019-05-16 DIAGNOSIS — M54.50 ACUTE LEFT-SIDED LOW BACK PAIN WITHOUT SCIATICA: Primary | ICD-10-CM

## 2019-05-16 PROCEDURE — 97110 THERAPEUTIC EXERCISES: CPT

## 2019-05-16 PROCEDURE — 97140 MANUAL THERAPY 1/> REGIONS: CPT

## 2019-05-16 PROCEDURE — 97014 ELECTRIC STIMULATION THERAPY: CPT

## 2019-05-20 ENCOUNTER — OFFICE VISIT (OUTPATIENT)
Dept: PHYSICAL THERAPY | Facility: CLINIC | Age: 39
End: 2019-05-20
Payer: COMMERCIAL

## 2019-05-20 DIAGNOSIS — M54.50 ACUTE LEFT-SIDED LOW BACK PAIN WITHOUT SCIATICA: Primary | ICD-10-CM

## 2019-05-20 DIAGNOSIS — S33.6XXA SPRAIN OF SACROILIAC LIGAMENT, INITIAL ENCOUNTER: ICD-10-CM

## 2019-05-20 PROCEDURE — 97110 THERAPEUTIC EXERCISES: CPT | Performed by: PHYSICAL THERAPIST

## 2019-05-20 PROCEDURE — 97112 NEUROMUSCULAR REEDUCATION: CPT | Performed by: PHYSICAL THERAPIST

## 2019-05-20 PROCEDURE — 97014 ELECTRIC STIMULATION THERAPY: CPT | Performed by: PHYSICAL THERAPIST

## 2019-05-20 PROCEDURE — 97140 MANUAL THERAPY 1/> REGIONS: CPT | Performed by: PHYSICAL THERAPIST

## 2019-05-21 ENCOUNTER — OFFICE VISIT (OUTPATIENT)
Dept: PHYSICAL THERAPY | Facility: CLINIC | Age: 39
End: 2019-05-21
Payer: COMMERCIAL

## 2019-05-21 DIAGNOSIS — S33.6XXA SPRAIN OF SACROILIAC LIGAMENT, INITIAL ENCOUNTER: ICD-10-CM

## 2019-05-21 DIAGNOSIS — M54.50 ACUTE LEFT-SIDED LOW BACK PAIN WITHOUT SCIATICA: Primary | ICD-10-CM

## 2019-05-21 PROCEDURE — 97112 NEUROMUSCULAR REEDUCATION: CPT

## 2019-05-21 PROCEDURE — 97110 THERAPEUTIC EXERCISES: CPT

## 2019-05-21 PROCEDURE — 97140 MANUAL THERAPY 1/> REGIONS: CPT

## 2019-05-23 ENCOUNTER — APPOINTMENT (OUTPATIENT)
Dept: PHYSICAL THERAPY | Facility: CLINIC | Age: 39
End: 2019-05-23
Payer: COMMERCIAL

## 2019-05-27 ENCOUNTER — APPOINTMENT (OUTPATIENT)
Dept: PHYSICAL THERAPY | Facility: CLINIC | Age: 39
End: 2019-05-27
Payer: COMMERCIAL

## 2019-05-28 ENCOUNTER — APPOINTMENT (OUTPATIENT)
Dept: PHYSICAL THERAPY | Facility: CLINIC | Age: 39
End: 2019-05-28
Payer: COMMERCIAL

## 2019-05-30 ENCOUNTER — APPOINTMENT (OUTPATIENT)
Dept: PHYSICAL THERAPY | Facility: CLINIC | Age: 39
End: 2019-05-30
Payer: COMMERCIAL

## 2021-09-09 ENCOUNTER — NURSE TRIAGE (OUTPATIENT)
Dept: OTHER | Facility: OTHER | Age: 41
End: 2021-09-09

## 2021-09-09 DIAGNOSIS — Z20.822 ENCOUNTER FOR SCREENING LABORATORY TESTING FOR COVID-19 VIRUS: Primary | ICD-10-CM

## 2021-09-09 PROCEDURE — U0003 INFECTIOUS AGENT DETECTION BY NUCLEIC ACID (DNA OR RNA); SEVERE ACUTE RESPIRATORY SYNDROME CORONAVIRUS 2 (SARS-COV-2) (CORONAVIRUS DISEASE [COVID-19]), AMPLIFIED PROBE TECHNIQUE, MAKING USE OF HIGH THROUGHPUT TECHNOLOGIES AS DESCRIBED BY CMS-2020-01-R: HCPCS | Performed by: FAMILY MEDICINE

## 2021-09-09 PROCEDURE — U0005 INFEC AGEN DETEC AMPLI PROBE: HCPCS | Performed by: FAMILY MEDICINE

## 2021-09-09 NOTE — TELEPHONE ENCOUNTER
Patient is requesting a covid test and does not have a [facility]'s PCP  Reports having an out of network PCP  Order placed  Patient informed of closest testing site and was advised of hours of operation, address, to wear a mask, and to stay in the car  Patient verbalized understanding  Link sent to Patients email address to create a [employer] account to check for results  Reason for Disposition   [1] COVID-19 infection suspected by caller or triager AND [2] mild symptoms (cough, fever, or others) AND [3] no complications or SOB    Answer Assessment - Initial Assessment Questions  Were you within 6 feet or less, for up to 15 minutes or more with a person that has a confirmed COVID-19 test? Yes     What was the date of your exposure?  Living with mother who is positive     Are you experiencing any symptoms attributed to the virus?  (Assess for SOB, cough, fever, difficulty breathing) headache, sore throat, cough, body aches     HIGH RISK: Do you have any history heart or lung conditions, weakened immune system, diabetes, Asthma, CHF, HIV, COPD, Chemo, renal failure, sickle cell, etc? Denies    Protocols used: CORONAVIRUS (COVID-19) DIAGNOSED OR SUSPECTED-ADULT-

## 2021-10-29 ENCOUNTER — OFFICE VISIT (OUTPATIENT)
Dept: URGENT CARE | Facility: CLINIC | Age: 41
End: 2021-10-29
Payer: COMMERCIAL

## 2021-10-29 VITALS
WEIGHT: 173 LBS | HEIGHT: 67 IN | OXYGEN SATURATION: 98 % | DIASTOLIC BLOOD PRESSURE: 75 MMHG | HEART RATE: 85 BPM | SYSTOLIC BLOOD PRESSURE: 125 MMHG | TEMPERATURE: 97.5 F | RESPIRATION RATE: 18 BRPM | BODY MASS INDEX: 27.15 KG/M2

## 2021-10-29 DIAGNOSIS — H69.81 DYSFUNCTION OF RIGHT EUSTACHIAN TUBE: ICD-10-CM

## 2021-10-29 DIAGNOSIS — R68.89 FLU-LIKE SYMPTOMS: Primary | ICD-10-CM

## 2021-10-29 PROCEDURE — G0382 LEV 3 HOSP TYPE B ED VISIT: HCPCS | Performed by: EMERGENCY MEDICINE

## 2021-10-29 PROCEDURE — U0003 INFECTIOUS AGENT DETECTION BY NUCLEIC ACID (DNA OR RNA); SEVERE ACUTE RESPIRATORY SYNDROME CORONAVIRUS 2 (SARS-COV-2) (CORONAVIRUS DISEASE [COVID-19]), AMPLIFIED PROBE TECHNIQUE, MAKING USE OF HIGH THROUGHPUT TECHNOLOGIES AS DESCRIBED BY CMS-2020-01-R: HCPCS | Performed by: EMERGENCY MEDICINE

## 2021-10-29 PROCEDURE — U0005 INFEC AGEN DETEC AMPLI PROBE: HCPCS | Performed by: EMERGENCY MEDICINE

## 2021-10-30 LAB — SARS-COV-2 RNA RESP QL NAA+PROBE: NEGATIVE

## 2022-01-11 ENCOUNTER — NURSE TRIAGE (OUTPATIENT)
Dept: OTHER | Facility: OTHER | Age: 42
End: 2022-01-11

## 2022-01-11 DIAGNOSIS — Z20.822 EXPOSURE TO COVID-19 VIRUS: Primary | ICD-10-CM

## 2022-01-11 PROCEDURE — U0005 INFEC AGEN DETEC AMPLI PROBE: HCPCS | Performed by: FAMILY MEDICINE

## 2022-01-11 PROCEDURE — U0003 INFECTIOUS AGENT DETECTION BY NUCLEIC ACID (DNA OR RNA); SEVERE ACUTE RESPIRATORY SYNDROME CORONAVIRUS 2 (SARS-COV-2) (CORONAVIRUS DISEASE [COVID-19]), AMPLIFIED PROBE TECHNIQUE, MAKING USE OF HIGH THROUGHPUT TECHNOLOGIES AS DESCRIBED BY CMS-2020-01-R: HCPCS | Performed by: FAMILY MEDICINE

## 2022-01-11 NOTE — TELEPHONE ENCOUNTER
Reason for Disposition   [1] COVID-19 infection suspected by caller or triager AND [2] mild symptoms (cough, fever, or others) AND [3] has not gotten tested yet    Answer Assessment - Initial Assessment Questions  1  COVID-19 DIAGNOSIS: "Who made your COVID-19 diagnosis?" "Was it confirmed by a positive lab test?" If not diagnosed by a HCP, ask "Are there lots of cases (community spread) where you live?" Note: See public health department website, if unsure  Patient developed covid like symptoms today in a morning   2  COVID-19 EXPOSURE: "Was there any known exposure to COVID before the symptoms began?" CDC Definition of close contact: within 6 feet (2 meters) for a total of 15 minutes or more over a 24-hour period  Unknown   3  ONSET: "When did the COVID-19 symptoms start?"       Today in a morning   4  WORST SYMPTOM: "What is your worst symptom?" (e g , cough, fever, shortness of breath, muscle aches)      Sore throat, hoarseness   5  COUGH: "Do you have a cough?" If Yes, ask: "How bad is the cough?"        Mild dry cough   6  FEVER: "Do you have a fever?" If Yes, ask: "What is your temperature, how was it measured, and when did it start?"      No   7  RESPIRATORY STATUS: "Describe your breathing?" (e g , shortness of breath, wheezing, unable to speak)       No SOB   8  BETTER-SAME-WORSE: "Are you getting better, staying the same or getting worse compared to yesterday?"  If getting worse, ask, "In what way?"      First day today   9  HIGH RISK DISEASE: "Do you have any chronic medical problems?" (e g , asthma, heart or lung disease, weak immune system, obesity, etc )      No   10  VACCINE: "Have you gotten the COVID-19 vaccine?" If Yes ask: "Which one, how many shots, when did you get it?"        Unvaccinated   12   OTHER SYMPTOMS: "Do you have any other symptoms?"  (e g , chills, fatigue, headache, loss of smell or taste, muscle pain, sore throat; new loss of smell or taste especially support the diagnosis of COVID-19)        Sore throat      Protocols used: CORONAVIRUS (UCNXY-35) DIAGNOSED OR SUSPECTED-ADULT-[place]

## 2022-01-11 NOTE — TELEPHONE ENCOUNTER
Regarding: Covid- sore throat, fluid on lungs  ----- Message from Levy Roman sent at 1/11/2022  8:29 AM EST -----  "My daughter has Covid and I have a sore throat, I am losing my voice, and I have fluid building up on my lungs  "

## 2022-03-27 ENCOUNTER — HOSPITAL ENCOUNTER (EMERGENCY)
Facility: HOSPITAL | Age: 42
Discharge: HOME | End: 2022-03-27
Admitting: EMERGENCY MEDICINE
Payer: COMMERCIAL

## 2022-03-27 ENCOUNTER — APPOINTMENT (EMERGENCY)
Dept: RADIOLOGY | Facility: HOSPITAL | Age: 42
End: 2022-03-27
Payer: COMMERCIAL

## 2022-03-27 VITALS
SYSTOLIC BLOOD PRESSURE: 130 MMHG | RESPIRATION RATE: 16 BRPM | TEMPERATURE: 98.9 F | HEIGHT: 67 IN | WEIGHT: 180 LBS | DIASTOLIC BLOOD PRESSURE: 76 MMHG | BODY MASS INDEX: 28.25 KG/M2 | OXYGEN SATURATION: 98 % | HEART RATE: 87 BPM

## 2022-03-27 DIAGNOSIS — R68.89 FLU-LIKE SYMPTOMS: Primary | ICD-10-CM

## 2022-03-27 LAB
ALBUMIN SERPL BCP-MCNC: 3.5 G/DL (ref 3.5–5)
ALP SERPL-CCNC: 84 U/L (ref 46–116)
ALT SERPL W P-5'-P-CCNC: 24 U/L (ref 12–78)
ANION GAP SERPL CALCULATED.3IONS-SCNC: 5 MMOL/L (ref 4–13)
AST SERPL W P-5'-P-CCNC: 15 U/L (ref 5–45)
ATRIAL RATE: 69 BPM
BASOPHILS # BLD AUTO: 0.02 THOUSANDS/ΜL (ref 0–0.1)
BASOPHILS NFR BLD AUTO: 1 % (ref 0–1)
BILIRUB SERPL-MCNC: 0.4 MG/DL (ref 0.2–1)
BUN SERPL-MCNC: 12 MG/DL (ref 5–25)
CALCIUM SERPL-MCNC: 8.4 MG/DL (ref 8.3–10.1)
CARDIAC TROPONIN I PNL SERPL HS: <2 NG/L
CHLORIDE SERPL-SCNC: 103 MMOL/L (ref 100–108)
CO2 SERPL-SCNC: 30 MMOL/L (ref 21–32)
CREAT SERPL-MCNC: 1.28 MG/DL (ref 0.6–1.3)
EOSINOPHIL # BLD AUTO: 0.2 THOUSAND/ΜL (ref 0–0.61)
EOSINOPHIL NFR BLD AUTO: 5 % (ref 0–6)
ERYTHROCYTE [DISTWIDTH] IN BLOOD BY AUTOMATED COUNT: 12.4 % (ref 11.6–15.1)
GFR SERPL CREATININE-BSD FRML MDRD: 68 ML/MIN/1.73SQ M
GLUCOSE SERPL-MCNC: 142 MG/DL (ref 65–140)
HCT VFR BLD AUTO: 42.7 % (ref 36.5–49.3)
HGB BLD-MCNC: 15.3 G/DL (ref 12–17)
IMM GRANULOCYTES # BLD AUTO: 0.01 THOUSAND/UL (ref 0–0.2)
IMM GRANULOCYTES NFR BLD AUTO: 0 % (ref 0–2)
LYMPHOCYTES # BLD AUTO: 1.48 THOUSANDS/ΜL (ref 0.6–4.47)
LYMPHOCYTES NFR BLD AUTO: 35 % (ref 14–44)
MCH RBC QN AUTO: 30.4 PG (ref 26.8–34.3)
MCHC RBC AUTO-ENTMCNC: 35.8 G/DL (ref 31.4–37.4)
MCV RBC AUTO: 85 FL (ref 82–98)
MONOCYTES # BLD AUTO: 0.39 THOUSAND/ΜL (ref 0.17–1.22)
MONOCYTES NFR BLD AUTO: 9 % (ref 4–12)
NEUTROPHILS # BLD AUTO: 2.19 THOUSANDS/ΜL (ref 1.85–7.62)
NEUTS SEG NFR BLD AUTO: 50 % (ref 43–75)
NRBC BLD AUTO-RTO: 0 /100 WBCS
NT-PROBNP SERPL-MCNC: 19 PG/ML
P AXIS: 53 DEGREES
PLATELET # BLD AUTO: 198 THOUSANDS/UL (ref 149–390)
PMV BLD AUTO: 9.5 FL (ref 8.9–12.7)
POTASSIUM SERPL-SCNC: 4.2 MMOL/L (ref 3.5–5.3)
PR INTERVAL: 152 MS
PROT SERPL-MCNC: 7.1 G/DL (ref 6.4–8.2)
QRS AXIS: 71 DEGREES
QRSD INTERVAL: 84 MS
QT INTERVAL: 358 MS
QTC INTERVAL: 383 MS
RBC # BLD AUTO: 5.03 MILLION/UL (ref 3.88–5.62)
SODIUM SERPL-SCNC: 138 MMOL/L (ref 136–145)
T WAVE AXIS: 21 DEGREES
VENTRICULAR RATE: 69 BPM
WBC # BLD AUTO: 4.29 THOUSAND/UL (ref 4.31–10.16)

## 2022-03-27 PROCEDURE — 87636 SARSCOV2 & INF A&B AMP PRB: CPT | Performed by: PHYSICIAN ASSISTANT

## 2022-03-27 PROCEDURE — 93005 ELECTROCARDIOGRAM TRACING: CPT

## 2022-03-27 PROCEDURE — 85025 COMPLETE CBC W/AUTO DIFF WBC: CPT | Performed by: PHYSICIAN ASSISTANT

## 2022-03-27 PROCEDURE — 99285 EMERGENCY DEPT VISIT HI MDM: CPT | Performed by: PHYSICIAN ASSISTANT

## 2022-03-27 PROCEDURE — 96374 THER/PROPH/DIAG INJ IV PUSH: CPT

## 2022-03-27 PROCEDURE — 94640 AIRWAY INHALATION TREATMENT: CPT

## 2022-03-27 PROCEDURE — 80053 COMPREHEN METABOLIC PANEL: CPT | Performed by: PHYSICIAN ASSISTANT

## 2022-03-27 PROCEDURE — 83880 ASSAY OF NATRIURETIC PEPTIDE: CPT | Performed by: PHYSICIAN ASSISTANT

## 2022-03-27 PROCEDURE — 36415 COLL VENOUS BLD VENIPUNCTURE: CPT | Performed by: PHYSICIAN ASSISTANT

## 2022-03-27 PROCEDURE — 96361 HYDRATE IV INFUSION ADD-ON: CPT

## 2022-03-27 PROCEDURE — 99284 EMERGENCY DEPT VISIT MOD MDM: CPT

## 2022-03-27 PROCEDURE — 71046 X-RAY EXAM CHEST 2 VIEWS: CPT

## 2022-03-27 PROCEDURE — 84484 ASSAY OF TROPONIN QUANT: CPT | Performed by: PHYSICIAN ASSISTANT

## 2022-03-27 RX ORDER — BENZONATATE 100 MG/1
100 CAPSULE ORAL ONCE
Status: COMPLETED | OUTPATIENT
Start: 2022-03-27 | End: 2022-03-27

## 2022-03-27 RX ORDER — BENZONATATE 100 MG/1
100 CAPSULE ORAL EVERY 8 HOURS
Qty: 21 CAPSULE | Refills: 0 | Status: SHIPPED | OUTPATIENT
Start: 2022-03-27

## 2022-03-27 RX ORDER — DEXAMETHASONE SODIUM PHOSPHATE 4 MG/ML
10 INJECTION, SOLUTION INTRA-ARTICULAR; INTRALESIONAL; INTRAMUSCULAR; INTRAVENOUS; SOFT TISSUE ONCE
Status: COMPLETED | OUTPATIENT
Start: 2022-03-27 | End: 2022-03-27

## 2022-03-27 RX ORDER — IPRATROPIUM BROMIDE AND ALBUTEROL SULFATE 2.5; .5 MG/3ML; MG/3ML
3 SOLUTION RESPIRATORY (INHALATION) ONCE
Status: COMPLETED | OUTPATIENT
Start: 2022-03-27 | End: 2022-03-27

## 2022-03-27 RX ADMIN — SODIUM CHLORIDE 1000 ML: 0.9 INJECTION, SOLUTION INTRAVENOUS at 17:11

## 2022-03-27 RX ADMIN — BENZONATATE 100 MG: 100 CAPSULE ORAL at 18:24

## 2022-03-27 RX ADMIN — IPRATROPIUM BROMIDE AND ALBUTEROL SULFATE 3 ML: 2.5; .5 SOLUTION RESPIRATORY (INHALATION) at 17:11

## 2022-03-27 RX ADMIN — DEXAMETHASONE SODIUM PHOSPHATE 10 MG: 4 INJECTION, SOLUTION INTRAMUSCULAR; INTRAVENOUS at 17:11

## 2022-03-27 NOTE — Clinical Note
Martha Isaacs was seen and treated in our emergency department on 3/27/2022  Diagnosis:     Abrahan Zamorano  may return to work on return date  He may return on this date: 03/30/2022         If you have any questions or concerns, please don't hesitate to call        Tanya Villanueva PA-C    ______________________________           _______________          _______________  Hospital Representative                              Date                                Time

## 2022-03-27 NOTE — ED PROVIDER NOTES
History  Chief Complaint   Patient presents with    Flu Symptoms     nausea, vomitting and diarrhea on Thursday  pt today is fcontinuing with cough and SOB     35year old male presents emergency department with significant other for evaluation of flu-like symptoms onset Tuesday 03/22/2022  Reports symptoms started with cough, congestion, wheezing  States he took two at home COVID tests which were negative  Reports on Thursday he had Nausea, vomiting, diarrhea which then did resolve  He states he continues with cough, congestion, and feeling fatigued  Reports some shortness of breath when coughing  He denies any hemoptysis  He denies fevers reports chills  Patient denies any sore throat ear pain  He denies any chest pain, palpitations, leg swelling  Reports history of bronchitis and asthma as a child  History provided by:  Patient  Flu Symptoms  Presenting symptoms: cough, diarrhea (resloved), fatigue, nausea (resolved), rhinorrhea and vomiting (resolved)    Presenting symptoms: no fever, no headaches, no myalgias, no shortness of breath and no sore throat    Cough:     Cough characteristics:  Productive    Sputum characteristics:  Yellow    Severity:  Moderate    Onset quality:  Gradual    Timing:  Constant    Progression:  Unchanged    Chronicity:  New  Diarrhea:     Quality:  Watery    Progression:  Resolved  Fatigue:     Severity:  Mild    Progression:  Unchanged  Severity:  Mild  Onset quality:  Gradual  Chronicity:  New  Relieved by:  Nothing  Worsened by:  Nothing  Ineffective treatments:  None tried  Associated symptoms: chills and nasal congestion    Associated symptoms: no decreased appetite, no decrease in physical activity, no ear pain, no mental status change, no neck stiffness and no syncope        Prior to Admission Medications   Prescriptions Last Dose Informant Patient Reported? Taking?    FLUoxetine (PROzac) 20 mg capsule   Yes No   Sig: Take 20 mg by mouth daily   albuterol (PROAIR HFA) 90 mcg/act inhaler   Yes No   Sig: Inhale 2 puffs      Facility-Administered Medications: None       Past Medical History:   Diagnosis Date    Depression     Head injury        Past Surgical History:   Procedure Laterality Date    BACK SURGERY         Family History   Problem Relation Age of Onset    No Known Problems Mother     No Known Problems Father      I have reviewed and agree with the history as documented  E-Cigarette/Vaping     E-Cigarette/Vaping Substances     Social History     Tobacco Use    Smoking status: Never Smoker    Smokeless tobacco: Never Used   Substance Use Topics    Alcohol use: Not Currently    Drug use: Not Currently       Review of Systems   Constitutional: Positive for chills and fatigue  Negative for appetite change, decreased appetite, diaphoresis and fever  HENT: Positive for congestion and rhinorrhea  Negative for ear pain and sore throat  Respiratory: Positive for cough  Negative for choking, chest tightness, shortness of breath, wheezing and stridor  Cardiovascular: Negative for chest pain, palpitations and leg swelling  Gastrointestinal: Positive for diarrhea (resloved), nausea (resolved) and vomiting (resolved)  Musculoskeletal: Negative  Negative for myalgias and neck stiffness  Skin: Negative  Neurological: Negative  Negative for headaches  All other systems reviewed and are negative  Physical Exam  Physical Exam  Vitals and nursing note reviewed  Constitutional:       General: He is not in acute distress  Appearance: Normal appearance  He is normal weight  He is not ill-appearing, toxic-appearing or diaphoretic  HENT:      Head: Normocephalic  Nose: Nose normal       Mouth/Throat:      Mouth: Mucous membranes are moist       Pharynx: Oropharynx is clear  No oropharyngeal exudate or posterior oropharyngeal erythema     Eyes:      Conjunctiva/sclera: Conjunctivae normal       Pupils: Pupils are equal, round, and reactive to light  Cardiovascular:      Rate and Rhythm: Normal rate and regular rhythm  Pulmonary:      Effort: Pulmonary effort is normal  No respiratory distress  Breath sounds: No stridor  No decreased breath sounds, wheezing, rhonchi or rales  Chest:      Chest wall: No tenderness  Abdominal:      General: Abdomen is flat  Bowel sounds are normal  There is no distension  Palpations: Abdomen is soft  Tenderness: There is no abdominal tenderness  There is no guarding  Musculoskeletal:         General: No swelling or deformity  Normal range of motion  Cervical back: Normal range of motion and neck supple  Right lower leg: No edema  Left lower leg: No edema  Skin:     General: Skin is warm and dry  Capillary Refill: Capillary refill takes less than 2 seconds  Findings: No bruising, erythema or rash  Neurological:      General: No focal deficit present  Mental Status: He is alert and oriented to person, place, and time     Psychiatric:         Mood and Affect: Mood normal          Behavior: Behavior normal          Vital Signs  ED Triage Vitals [03/27/22 1650]   Temperature Pulse Respirations Blood Pressure SpO2   98 9 °F (37 2 °C) 87 16 130/76 98 %      Temp src Heart Rate Source Patient Position - Orthostatic VS BP Location FiO2 (%)   -- -- -- -- --      Pain Score       3           Vitals:    03/27/22 1650   BP: 130/76   Pulse: 87         Visual Acuity      ED Medications  Medications   ipratropium-albuterol (DUO-NEB) 0 5-2 5 mg/3 mL inhalation solution 3 mL (3 mL Nebulization Given 3/27/22 1711)   dexamethasone (DECADRON) injection 10 mg (10 mg Intravenous Given 3/27/22 1711)   sodium chloride 0 9 % bolus 1,000 mL (0 mL Intravenous Stopped 3/27/22 1824)   benzonatate (TESSALON PERLES) capsule 100 mg (100 mg Oral Given 3/27/22 1824)       Diagnostic Studies  Results Reviewed     Procedure Component Value Units Date/Time    NT-BNP PRO [[id number]]  (Normal) Collected: 03/27/22 1710    Lab Status: Final result Specimen: Blood from Arm, Right Updated: 03/27/22 1753     NT-proBNP 19 pg/mL     Comprehensive metabolic panel [[id number]]  (Abnormal) Collected: 03/27/22 1710    Lab Status: Final result Specimen: Blood from Arm, Right Updated: 03/27/22 1750     Sodium 138 mmol/L      Potassium 4 2 mmol/L      Chloride 103 mmol/L      CO2 30 mmol/L      ANION GAP 5 mmol/L      BUN 12 mg/dL      Creatinine 1 28 mg/dL      Glucose 142 mg/dL      Calcium 8 4 mg/dL      AST 15 U/L      ALT 24 U/L      Alkaline Phosphatase 84 U/L      Total Protein 7 1 g/dL      Albumin 3 5 g/dL      Total Bilirubin 0 40 mg/dL      eGFR 68 ml/min/1 73sq m     Narrative:      [place] guidelines for Chronic Kidney Disease (CKD):     Stage 1 with normal or high GFR (GFR > 90 mL/min/1 73 square meters)    Stage 2 Mild CKD (GFR = 60-89 mL/min/1 73 square meters)    Stage 3A Moderate CKD (GFR = 45-59 mL/min/1 73 square meters)    Stage 3B Moderate CKD (GFR = 30-44 mL/min/1 73 square meters)    Stage 4 Severe CKD (GFR = 15-29 mL/min/1 73 square meters)    Stage 5 End Stage CKD (GFR <15 mL/min/1 73 square meters)  Note: GFR calculation is accurate only with a steady state creatinine    HS Troponin I 2hr [[id number]]     Lab Status: No result Specimen: Blood     HS Troponin 0hr (reflex protocol) [[id number]]  (Normal) Collected: 03/27/22 1710    Lab Status: Final result Specimen: Blood from Arm, Right Updated: 03/27/22 1746     hs TnI 0hr <2 ng/L     CBC and differential [[id number]]  (Abnormal) Collected: 03/27/22 1710    Lab Status: Final result Specimen: Blood from Arm, Right Updated: 03/27/22 1721     WBC 4 29 Thousand/uL      RBC 5 03 Million/uL      Hemoglobin 15 3 g/dL      Hematocrit 42 7 %      MCV 85 fL      MCH 30 4 pg      MCHC 35 8 g/dL      RDW 12 4 %      MPV 9 5 fL      Platelets 849 Thousands/uL      nRBC 0 /100 WBCs      Neutrophils Relative 50 %      Immat GRANS % 0 % Lymphocytes Relative 35 %      Monocytes Relative 9 %      Eosinophils Relative 5 %      Basophils Relative 1 %      Neutrophils Absolute 2 19 Thousands/µL      Immature Grans Absolute 0 01 Thousand/uL      Lymphocytes Absolute 1 48 Thousands/µL      Monocytes Absolute 0 39 Thousand/µL      Eosinophils Absolute 0 20 Thousand/µL      Basophils Absolute 0 02 Thousands/µL     COVID/FLU - 24 hour TAT [[id number]] Collected: 03/27/22 1710    Lab Status: In process Specimen: Nares from Nose Updated: 03/27/22 1718                 XR chest 2 views   ED Interpretation by Milad Hewitt PA-C (03/27 1834)   No acute cardiopulmonary findings                 Procedures  ECG 12 Lead Documentation Only    Date/Time: 3/27/2022 4:58 PM  Performed by: Milad Hewitt PA-C  Authorized by: Milad Hewitt PA-C     Patient location:  ED  Interpretation:     Interpretation: non-specific    Rate:     ECG rate:  69    ECG rate assessment: normal    Rhythm:     Rhythm: sinus rhythm    Ectopy:     Ectopy: none    QRS:     QRS axis:  Normal    QRS intervals:  Normal  Conduction:     Conduction: normal    ST segments:     ST segments:  Normal  T waves:     T waves: normal               ED Course  ED Course as of 03/27/22 1838   Sun Mar 27, 2022   1725 WBC(!): 4 29   1750 ED interpretation of chest x-ray is negative for acute cardiopulmonary findings   1751 hs TnI 0hr: <2   1751 CMP Unremarkable    1758 NT-proBNP: 19   1800 I discussed all results and findings patient  Discussed symptomatic treatment at and strict return precautions discussed appropriate follow-up  Patient verbalized understanding was agreed with this treatment was discharged                               SBIRT 20yo+      Most Recent Value   SBIRT (22 yo +)    In order to provide better care to our patients, we are screening all of our patients for alcohol and drug use  Would it be okay to ask you these screening questions?  Yes Filed at: 03/27/2022 1649   Initial Alcohol Screen: US AUDIT-C     1  How often do you have a drink containing alcohol? 0 Filed at: 03/27/2022 1649   2  How many drinks containing alcohol do you have on a typical day you are drinking? 0 Filed at: 03/27/2022 1649   3a  Male UNDER 65: How often do you have five or more drinks on one occasion? 0 Filed at: 03/27/2022 1649   3b  FEMALE Any Age, or MALE 65+: How often do you have 4 or more drinks on one occassion? 0 Filed at: 03/27/2022 1649   Audit-C Score 0 Filed at: 03/27/2022 1649   TEO: How many times in the past year have you    Used an illegal drug or used a prescription medication for non-medical reasons? Never Filed at: 03/27/2022 1649                    MDM  Number of Diagnoses or Management Options  Flu-like symptoms: new and requires workup  Diagnosis management comments: 35year old male presents to the emergency department for evaluation of flu-like symptoms onset Tuesday, 5 days ago  Vitals and medical record reviewed  Physical exam relatively unremarkable lungs clear  Heart regular rate and rhythm  Laboratory findings relatively unremarkable  COVID, flu pending  ED interpretation chest x-ray negative for acute cardiopulmonary findings  Discussed results and findings as well symptomatic treatment is strict return precautions with the patient verbalized understanding    He agreed to this treatment plan and was discharged home       Amount and/or Complexity of Data Reviewed  Clinical lab tests: ordered and reviewed  Tests in the radiology section of CPT®: ordered and reviewed  Review and summarize past medical records: yes  Independent visualization of images, tracings, or specimens: yes        Disposition  Final diagnoses:   Flu-like symptoms     Time reflects when diagnosis was documented in both MDM as applicable and the Disposition within this note     Time User Action Codes Description Comment    3/27/2022  5:59 PM Lnua Rae Add [R68 89] Flu-like symptoms       ED Disposition ED Disposition Condition Date/Time Comment    Discharge Stable Sun Mar 27, 2022  5:59 PM Kathy Reddy discharge to home/self care  Follow-up Information     Follow up With Specialties Details [facility] [phone], [address]   [phone]  [phone]            Discharge Medication List as of 3/27/2022  6:05 PM      START taking these medications    Details   benzonatate (TESSALON PERLES) 100 mg capsule Take 1 capsule (100 mg total) by mouth every 8 (eight) hours, Starting Sun 3/27/2022, Normal         CONTINUE these medications which have NOT CHANGED    Details   albuterol (PROAIR HFA) 90 mcg/act inhaler Inhale 2 puffs, Starting Fri 2/8/2019, Historical Med      FLUoxetine (PROzac) 20 mg capsule Take 20 mg by mouth daily, Starting Thu 11/29/2018, Historical Med             No discharge procedures on file      PDMP Review     None          ED Provider  Electronically Signed by           Mp Francis PA-C  03/27/22 4143

## 2022-03-29 LAB
FLUAV RNA RESP QL NAA+PROBE: NEGATIVE
FLUBV RNA RESP QL NAA+PROBE: NEGATIVE
SARS-COV-2 RNA RESP QL NAA+PROBE: NEGATIVE

## 2022-09-14 ENCOUNTER — APPOINTMENT (OUTPATIENT)
Dept: RADIOLOGY | Facility: CLINIC | Age: 42
End: 2022-09-14
Payer: COMMERCIAL

## 2022-09-14 ENCOUNTER — OFFICE VISIT (OUTPATIENT)
Dept: URGENT CARE | Facility: CLINIC | Age: 42
End: 2022-09-14
Payer: COMMERCIAL

## 2022-09-14 VITALS
WEIGHT: 175 LBS | SYSTOLIC BLOOD PRESSURE: 119 MMHG | TEMPERATURE: 97.4 F | RESPIRATION RATE: 16 BRPM | BODY MASS INDEX: 27.47 KG/M2 | HEART RATE: 88 BPM | OXYGEN SATURATION: 99 % | HEIGHT: 67 IN | DIASTOLIC BLOOD PRESSURE: 65 MMHG

## 2022-09-14 DIAGNOSIS — M79.602 ARM PAIN, LEFT: ICD-10-CM

## 2022-09-14 DIAGNOSIS — M79.602 ARM PAIN, LEFT: Primary | ICD-10-CM

## 2022-09-14 PROCEDURE — 73060 X-RAY EXAM OF HUMERUS: CPT

## 2022-09-14 PROCEDURE — G0382 LEV 3 HOSP TYPE B ED VISIT: HCPCS | Performed by: EMERGENCY MEDICINE

## 2022-09-14 RX ORDER — MELOXICAM 7.5 MG/1
TABLET ORAL
COMMUNITY
Start: 2022-06-13

## 2022-09-14 RX ORDER — MONTELUKAST SODIUM 10 MG/1
TABLET ORAL
COMMUNITY
Start: 2022-06-13

## 2022-09-14 RX ORDER — LEVALBUTEROL TARTRATE 45 UG/1
1 AEROSOL, METERED ORAL
COMMUNITY
Start: 2022-04-04

## 2022-09-14 NOTE — PATIENT INSTRUCTIONS
Arm Pain   WHAT YOU NEED TO KNOW:   Your arm pain may be caused by a number of conditions  Examples include arthritis, nerve problems, or an awkward position while you sleep  X-rays did not show a broken bone in your arm or wrist  Arm pain may be a sign of a serious condition that needs immediate care, such as a heart attack  DISCHARGE INSTRUCTIONS:   Call your local emergency number (911 in the [address] [facility]) for any of the following: You have any of the following signs of a heart attack:      Squeezing, pressure, or pain in your chest    You may  also have any of the following:     Discomfort or pain in your back, neck, jaw, stomach, or arm    Shortness of breath    Nausea or vomiting    Lightheadedness or a sudden cold sweat      Return to the emergency department if:   You have severe pain, or pain that spreads from your arm to other areas  You have swelling, tingling, or numbness in your hand or fingers, or the skin turns blue  You cannot move your arm  Call your doctor if:   You have questions or concerns about your condition or care  Medicines: You may need any of the following:  Prescription pain medicine  may be given  Ask your healthcare provider how to take this medicine safely  Some prescription pain medicines contain acetaminophen  Do not take other medicines that contain acetaminophen without talking to your healthcare provider  Too much acetaminophen may cause liver damage  Prescription pain medicine may cause constipation  Ask your healthcare provider how to prevent or treat constipation  NSAIDs , such as ibuprofen, help decrease swelling, pain, and fever  This medicine is available with or without a doctor's order  NSAIDs can cause stomach bleeding or kidney problems in certain people  If you take blood thinner medicine, always ask your healthcare provider if NSAIDs are safe for you  Always read the medicine label and follow directions  Take your medicine as directed    Contact your healthcare provider if you think your medicine is not helping or if you have side effects  Tell him or her if you are allergic to any medicine  Keep a list of the medicines, vitamins, and herbs you take  Include the amounts, and when and why you take them  Bring the list or the pill bottles to follow-up visits  Carry your medicine list with you in case of an emergency  Self-care:   Rest your arm as directed  A sling may be used to keep your arm from moving while it heals  Apply ice as directed  Ice helps decrease pain and swelling  Ice may also help prevent tissue damage  Use an ice pack, or put crushed ice in a plastic bag  Cover it with a towel  Apply it to your arm for 20 minutes every few hours, or as directed  Ask how many times to apply ice each day, and for how many days  Elevate your arm above the level of your heart as often as you can  This will help decrease swelling and pain  Prop your arm on pillows or blankets to keep the area elevated comfortably  Adjust your position if you work in front of a computer  You may need arm or wrist supports or change the height of your chair  Keep a pain record  Write down when your pain happens and how severe it is  Include any other symptoms you have with your pain  A record will help you keep track of pain cycles  Bring the record with you to your follow-up visits  It may also help your healthcare provider find out what is causing your pain  Follow up with your doctor as directed: You may need physical therapy  You may need to see an orthopedic specialist  Write down your questions so you remember to ask them during your visits  © Copyright [employer] 2022 Information is for End User's use only and may not be sold, redistributed or otherwise used for commercial purposes   All illustrations and images included in CareNotes® are the copyrighted property of A D A M , Inc  or Fernando Puri   The above information is an  only  It is not intended as medical advice for individual conditions or treatments  Talk to your doctor, nurse or pharmacist before following any medical regimen to see if it is safe and effective for you

## 2022-09-14 NOTE — PROGRESS NOTES
[facility]        NAME: Luh Owens is a 38 y o  male  : 1980    MRN: [id number]  DATE: 2022  TIME: 1:56 PM    Assessment and Plan   Arm pain, left [M79 602]  1  Arm pain, left  XR humerus left         Patient Instructions     Patient Instructions     Arm Pain   WHAT YOU NEED TO KNOW:   Your arm pain may be caused by a number of conditions  Examples include arthritis, nerve problems, or an awkward position while you sleep  X-rays did not show a broken bone in your arm or wrist  Arm pain may be a sign of a serious condition that needs immediate care, such as a heart attack  DISCHARGE INSTRUCTIONS:   Call your local emergency number (911 in the [address] [place] [address],[address]) for any of the followin  You have any of the following signs of a heart attack:      1  Squeezing, pressure, or pain in your chest    2  You may  also have any of the following:     § Discomfort or pain in your back, neck, jaw, stomach, or arm    § Shortness of breath    § Nausea or vomiting    § Lightheadedness or a sudden cold sweat      Return to the emergency department if:   · You have severe pain, or pain that spreads from your arm to other areas  · You have swelling, tingling, or numbness in your hand or fingers, or the skin turns blue  · You cannot move your arm  Call your doctor if:   · You have questions or concerns about your condition or care  Medicines: You may need any of the following:  · Prescription pain medicine  may be given  Ask your healthcare provider how to take this medicine safely  Some prescription pain medicines contain acetaminophen  Do not take other medicines that contain acetaminophen without talking to your healthcare provider  Too much acetaminophen may cause liver damage  Prescription pain medicine may cause constipation  Ask your healthcare provider how to prevent or treat constipation  · NSAIDs , such as ibuprofen, help decrease swelling, pain, and fever   This medicine is available with or without a doctor's order  NSAIDs can cause stomach bleeding or kidney problems in certain people  If you take blood thinner medicine, always ask your healthcare provider if NSAIDs are safe for you  Always read the medicine label and follow directions  · Take your medicine as directed  Contact your healthcare provider if you think your medicine is not helping or if you have side effects  Tell him or her if you are allergic to any medicine  Keep a list of the medicines, vitamins, and herbs you take  Include the amounts, and when and why you take them  Bring the list or the pill bottles to follow-up visits  Carry your medicine list with you in case of an emergency  Self-care:   · Rest your arm as directed  A sling may be used to keep your arm from moving while it heals  · Apply ice as directed  Ice helps decrease pain and swelling  Ice may also help prevent tissue damage  Use an ice pack, or put crushed ice in a plastic bag  Cover it with a towel  Apply it to your arm for 20 minutes every few hours, or as directed  Ask how many times to apply ice each day, and for how many days  · Elevate your arm above the level of your heart as often as you can  This will help decrease swelling and pain  Prop your arm on pillows or blankets to keep the area elevated comfortably  · Adjust your position if you work in front of a computer  You may need arm or wrist supports or change the height of your chair  · Keep a pain record  Write down when your pain happens and how severe it is  Include any other symptoms you have with your pain  A record will help you keep track of pain cycles  Bring the record with you to your follow-up visits  It may also help your healthcare provider find out what is causing your pain  Follow up with your doctor as directed: You may need physical therapy   You may need to see an orthopedic specialist  Write down your questions so you remember to ask them during your visits  © Copyright [address] 2022 Information is for End User's use only and may not be sold, redistributed or otherwise used for commercial purposes  All illustrations and images included in CareNotes® are the copyrighted property of [employer] M [employer]  or Fernando Marin  The above information is an  only  It is not intended as medical advice for individual conditions or treatments  Talk to your doctor, nurse or pharmacist before following any medical regimen to see if it is safe and effective for you  Follow up with PCP in 3-5 days  Proceed to  ER if symptoms worsen  Chief Complaint     Chief Complaint   Patient presents with    Arm Pain     Left arm soreness and pain, arm feels weak with lifting  PT also states that he can feel some grinding and popping  Since this morning  History of Present Illness       Patient complains of left upper arm pain medial aspect since awakening this morning  He denies any trauma  Pain improved after using Aspercreme  He denies similar symptoms in the past       Review of Systems   Review of Systems   Constitutional: Negative for chills and fever  Musculoskeletal: Positive for myalgias  Negative for arthralgias, gait problem and joint swelling  Skin: Negative for color change, rash and wound  Neurological: Negative for numbness           Current Medications       Current Outpatient Medications:     albuterol (PROVENTIL HFA,VENTOLIN HFA) 90 mcg/act inhaler, Inhale 2 puffs, Disp: , Rfl:     FLUoxetine (PROzac) 20 mg capsule, Take 20 mg by mouth daily, Disp: , Rfl: 5    levalbuterol (XOPENEX HFA) 45 mcg/act inhaler, Inhale 1 puff, Disp: , Rfl:     meloxicam (MOBIC) 7 5 mg tablet, , Disp: , Rfl:     montelukast (SINGULAIR) 10 mg tablet, , Disp: , Rfl:     benzonatate (TESSALON PERLES) 100 mg capsule, Take 1 capsule (100 mg total) by mouth every 8 (eight) hours (Patient not taking: Reported on 9/14/2022), Disp: 21 capsule, Rfl: 0    Current Allergies     Allergies as of 09/14/2022    (No Known Allergies)            The following portions of the patient's history were reviewed and updated as appropriate: allergies, current medications, past family history, past medical history, past social history, past surgical history and problem list      Past Medical History:   Diagnosis Date    Depression     Head injury        Past Surgical History:   Procedure Laterality Date    BACK SURGERY         Family History   Problem Relation Age of Onset    No Known Problems Mother     Atrial fibrillation Father     Diabetes Father     Hypertension Father          Medications have been verified  Objective   /65   Pulse 88   Temp (!) 97 4 °F (36 3 °C)   Resp 16   Ht 5' 7" (1 702 m)   Wt 79 4 kg (175 lb)   SpO2 99%   BMI 27 41 kg/m²        Physical Exam     Physical Exam  Vitals and nursing note reviewed  Constitutional:       General: He is not in acute distress  Appearance: He is well-developed  Cardiovascular:      Rate and Rhythm: Normal rate and regular rhythm  Pulmonary:      Effort: Pulmonary effort is normal       Breath sounds: Normal breath sounds  Musculoskeletal:         General: Tenderness present  No deformity  Cervical back: Neck supple  Comments: Tender medial aspect left upper arm   Skin:     General: Skin is warm and dry  Findings: No erythema or rash  Neurological:      Mental Status: He is alert and oriented to person, place, and time  Deep Tendon Reflexes: Reflexes are normal and symmetric  Psychiatric:         Mood and Affect: Mood normal          Behavior: Behavior normal          Thought Content:  Thought content normal          Judgment: Judgment normal

## 2022-09-14 NOTE — LETTER
September 14, 2022     Patient: Sumeet Perez   YOB: 1980   Date of Visit: 9/14/2022       To Whom it May Concern:    Nicanor Kang was seen in my clinic on 9/14/2022  He may return to work on 09/16/2022  If you have any questions or concerns, please don't hesitate to call           Sincerely,          Eusebio Elder MD        CC: No Recipients

## 2022-09-15 ENCOUNTER — OFFICE VISIT (OUTPATIENT)
Dept: PHYSICAL THERAPY | Facility: CLINIC | Age: 42
End: 2022-09-15
Payer: COMMERCIAL

## 2022-09-15 DIAGNOSIS — M79.602 ARM PAIN, LEFT: ICD-10-CM

## 2022-09-15 PROCEDURE — 97161 PT EVAL LOW COMPLEX 20 MIN: CPT | Performed by: PHYSICAL THERAPIST

## 2022-09-15 PROCEDURE — 97110 THERAPEUTIC EXERCISES: CPT | Performed by: PHYSICAL THERAPIST

## 2022-09-15 NOTE — PROGRESS NOTES
PT Evaluation     Today's date: 9/15/2022  Patient name: Alycia Harris  : 1980  MRN: [id number]  Referring provider: Varsha Mcnally MD  Dx:   Encounter Diagnosis     ICD-10-CM    1  Arm pain, left  M79 602 Ambulatory Referral to Physical Therapy                  Assessment  Assessment details: Alycia Harris is a 38 y o  male presenting to PT with pain decreased strength, and decreased tolerance to activity in L elbow/shoulder  Pt denies any trauma or injury to area, but woke up yesterday with pain in his upper arm and was unable to lift objects at work  Negative for any fractures per x-ray, but special tests during examination show possible biceps strain  Patient would benefit from skilled PT services to address these impairments and to maximize function in order to improve quality of life  Thank you for the referral   Impairments: activity intolerance, impaired physical strength, lacks appropriate home exercise program and pain with function    Symptom irritability: moderateUnderstanding of Dx/Px/POC: excellent  Plan  Patient would benefit from: skilled physical therapy  Planned modality interventions: TENS and cryotherapy  Planned therapy interventions: home exercise program, functional ROM exercises, flexibility, therapeutic exercise, therapeutic training, therapeutic activities, stretching, strengthening, patient education, massage, manual therapy and neuromuscular re-education  Frequency: 2x week  Treatment plan discussed with: patient        Subjective Evaluation    History of Present Illness  Mechanism of injury: Pt notes that he woke up yesterday and had L arm pain from his elbow to his shoulder  Denies any injury or trauma  Pt works as a printing , 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  States that he had to leave work early because he could not lift what he needed to at work  Pt has been taking over the counter meds, has not really helped   He notes that the pain is worse when he uses his arm  He denies any numbness/tingling  Pt notes   Quality of life: excellent    Pain  Current pain ratin  At best pain ratin  At worst pain rating: 3  Location: L elbow to shoulder   Quality: dull ache  Relieving factors: medications and heat  Aggravating factors: lifting and overhead activity    Hand dominance: left      Diagnostic Tests  X-ray: normal  Treatments  No previous or current treatments  Patient Goals  Patient goals for therapy: decreased pain, increased motion, increased strength and return to work          Objective     Postural Observations  Seated posture: fair  Standing posture: fair        Palpation   Left   Tenderness of the biceps and triceps  Tenderness     Left Shoulder   No tenderness in the biceps tendon (proximal), bicipital groove, lateral scapula, medial scapula, subscapularis tendon and supraspinatus tendon  Cervical/Thoracic Screen   Cervical range of motion within normal limits    Neurological Testing     Sensation     Shoulder   Left Shoulder   Intact: light touch    Right Shoulder   Intact: light touch    Active Range of Motion   Left Shoulder   Normal active range of motion    Right Shoulder   Normal active range of motion    Left Elbow   Normal active range of motion    Right Elbow   Normal active range of motion    Passive Range of Motion     Left Elbow   Normal passive range of motion    Right Elbow   Normal passive range of motion    Strength/Myotome Testing     Left Shoulder     Planes of Motion   Flexion: 4- (pain)   Abduction: 4- (pain)   External rotation at 90°: 4   Internal rotation at 90°: 4     Isolated Muscles   Biceps: 4- (pain)   Triceps: 4- (pain)     Right Shoulder   Normal muscle strength    Left Elbow   Flexion: 4- (pain)  Extension: 4- (pain)  Forearm supination: 4-  Forearm pronation: 4-    Right Elbow   Normal strength    Tests     Left Shoulder   Positive Yergason's  Negative drop arm, empty can, full can and Hawkin's  Left Elbow   Positive elbow flexion  Negative Cozen's, Mill's, valgus stress at 30 degrees and varus stress at 30 degrees  Precautions: h/o back surgery      Daily Treatment Diary:      Initial Evaluation Date: 09/15/22  Compliance 9/15                     Visit Number 1                    Re-Eval  IE                 MC   Foto Captured Y                           9/15                     Manual                      L shoulder ROM/elbow ROM                      STM L bicep                                            Ther-Ex                      pulleys -                     UBE -                     Bicep stretch HEP                     triceps stretch HEP                     Bicep curl TB HEP                     Triceps ext  On machine -                     No monies HEP                     Wall angels  -                     Pro/sup   With weight HEP                                           Neuro Re-Ed                                                                                                Ther-Act                                                               Modalities                      IFC/CP 15'

## 2022-09-15 NOTE — LETTER
September 15, 2022    Lakia Fernandez, [address]    Patient: Nat Coleman   YOB: 1980   Date of Visit: 9/15/2022     Encounter Diagnosis     ICD-10-CM    1  Arm pain, left  M79 602 Ambulatory Referral to Physical Therapy       Dear Dr Harini Marquez:    Thank you for your recent referral of Nat Coleman  Please review the attached evaluation summary from Hussain's recent visit  Please verify that you agree with the plan of care by signing the attached order  If you have any questions or concerns, please do not hesitate to call  I sincerely appreciate the opportunity to share in the care of one of your patients and hope to have another opportunity to work with you in the near future  Sincerely,    Roger Ramos, PT      Referring Provider:      I certify that I have read the below Plan of Care and certify the need for these services furnished under this plan of treatment while under my care  Lakia Fernandez MD  [facility] [phone]  Via Fax: [phone]          PT Evaluation     Today's date: 9/15/2022  Patient name: Nat Coleman  : 1980  MRN: [id number]  Referring provider: Estefany Mcgee MD  Dx:   Encounter Diagnosis     ICD-10-CM    1  Arm pain, left  M79 602 Ambulatory Referral to Physical Therapy                  Assessment  Assessment details: Nat Colmean is a 38 y o  male presenting to PT with pain decreased strength, and decreased tolerance to activity in L elbow/shoulder  Pt denies any trauma or injury to area, but woke up yesterday with pain in his upper arm and was unable to lift objects at work  Negative for any fractures per x-ray, but special tests during examination show possible biceps strain  Patient would benefit from skilled PT services to address these impairments and to maximize function in order to improve quality of life   Thank you for the referral   Impairments: activity intolerance, impaired physical strength, lacks appropriate home exercise program and pain with function    Symptom irritability: moderateUnderstanding of Dx/Px/POC: excellent  Plan  Patient would benefit from: skilled physical therapy  Planned modality interventions: TENS and cryotherapy  Planned therapy interventions: home exercise program, functional ROM exercises, flexibility, therapeutic exercise, therapeutic training, therapeutic activities, stretching, strengthening, patient education, massage, manual therapy and neuromuscular re-education  Frequency: 2x week  Treatment plan discussed with: patient        Subjective Evaluation    History of Present Illness  Mechanism of injury: Pt notes that he woke up yesterday and had L arm pain from his elbow to his shoulder  Denies any injury or trauma  Pt works as a printing , 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  States that he had to leave work early because he could not lift what he needed to at work  Pt has been taking over the counter meds, has not really helped  He notes that the pain is worse when he uses his arm  He denies any numbness/tingling  Pt notes   Quality of life: excellent    Pain  Current pain ratin  At best pain ratin  At worst pain rating: 3  Location: L elbow to shoulder   Quality: dull ache  Relieving factors: medications and heat  Aggravating factors: lifting and overhead activity    Hand dominance: left      Diagnostic Tests  X-ray: normal  Treatments  No previous or current treatments  Patient Goals  Patient goals for therapy: decreased pain, increased motion, increased strength and return to work          Objective     Postural Observations  Seated posture: fair  Standing posture: fair        Palpation   Left   Tenderness of the biceps and triceps       Tenderness     Left Shoulder   No tenderness in the biceps tendon (proximal), bicipital groove, lateral scapula, medial scapula, subscapularis tendon and supraspinatus tendon  Cervical/Thoracic Screen   Cervical range of motion within normal limits    Neurological Testing     Sensation     Shoulder   Left Shoulder   Intact: light touch    Right Shoulder   Intact: light touch    Active Range of Motion   Left Shoulder   Normal active range of motion    Right Shoulder   Normal active range of motion    Left Elbow   Normal active range of motion    Right Elbow   Normal active range of motion    Passive Range of Motion     Left Elbow   Normal passive range of motion    Right Elbow   Normal passive range of motion    Strength/Myotome Testing     Left Shoulder     Planes of Motion   Flexion: 4- (pain)   Abduction: 4- (pain)   External rotation at 90°: 4   Internal rotation at 90°: 4     Isolated Muscles   Biceps: 4- (pain)   Triceps: 4- (pain)     Right Shoulder   Normal muscle strength    Left Elbow   Flexion: 4- (pain)  Extension: 4- (pain)  Forearm supination: 4-  Forearm pronation: 4-    Right Elbow   Normal strength    Tests     Left Shoulder   Positive Yergason's  Negative drop arm, empty can, full can and Hawkin's  Left Elbow   Positive elbow flexion  Negative Cozen's, Mill's, valgus stress at 30 degrees and varus stress at 30 degrees  Precautions: h/o back surgery      Daily Treatment Diary:      Initial Evaluation Date: 09/15/22  Compliance 9/15                     Visit Number 1                    Re-Eval  IE                 MC   Foto Captured Y                           9/15                     Manual                      L shoulder ROM/elbow ROM                      STM L bicep                                            Ther-Ex                      pulleys -                     UBE -                     Bicep stretch HEP                     triceps stretch HEP                     Bicep curl TB HEP                     Triceps ext   On machine -                     No monies HEP                     Wall angels  - Pro/sup   With weight HEP                                           Neuro Re-Ed                                                                                                Ther-Act                                                               Modalities                      IFC/CP 15'

## 2022-09-19 ENCOUNTER — OFFICE VISIT (OUTPATIENT)
Dept: PHYSICAL THERAPY | Facility: CLINIC | Age: 42
End: 2022-09-19
Payer: COMMERCIAL

## 2022-09-19 DIAGNOSIS — M79.602 ARM PAIN, LEFT: Primary | ICD-10-CM

## 2022-09-19 PROCEDURE — 97110 THERAPEUTIC EXERCISES: CPT | Performed by: PHYSICAL THERAPIST

## 2022-09-19 PROCEDURE — 97140 MANUAL THERAPY 1/> REGIONS: CPT | Performed by: PHYSICAL THERAPIST

## 2022-09-19 NOTE — PROGRESS NOTES
Daily Note     Today's date: 2022  Patient name: Lela Daley  : 1980  MRN: [id number]  Referring provider: Jimy Huynh MD  Dx:   Encounter Diagnosis     ICD-10-CM    1  Arm pain, left  M79 602                   Subjective: shoulder is sore today      Objective: See treatment diary below      Assessment: pain with IR mobility  Worked post RTC and subjectively had less pain  Incorporated additional scapular strength and RTC work  Plan: IR mobility, scap work       Initial Evaluation Date: 09/15/22  Compliance 9/15  9/19                   Visit Number 1  2                   Re-Eval  IE                 [facility]   Foto Captured Y                                9/15  9/19                   Manual                       L shoulder ROM/elbow ROM                       STM L bicep    post RTC                                           Ther-Ex                       pulleys -                     UBE -                     Bicep stretch HEP                     triceps stretch HEP                     Bicep curl TB HEP                     Triceps ext  On machine -                     Sleeper stretch  6x10"           No monies HEP rtb x20                   Wall angels  -                     Pro/sup   With weight HEP                     Supine tband 2 way retraction  rtb x20           tband er  gtb 3x12           tband rows low  gtb 2x15           tband high rows  gtb 2x15                                                Neuro Re-Ed                                                                                                                                               Ther-Act                                                                                                Modalities                       IFC/CP 13'

## 2022-09-21 ENCOUNTER — APPOINTMENT (OUTPATIENT)
Dept: PHYSICAL THERAPY | Facility: CLINIC | Age: 42
End: 2022-09-21
Payer: COMMERCIAL

## 2022-09-27 ENCOUNTER — OFFICE VISIT (OUTPATIENT)
Dept: PHYSICAL THERAPY | Facility: CLINIC | Age: 42
End: 2022-09-27
Payer: COMMERCIAL

## 2022-09-27 DIAGNOSIS — M79.602 ARM PAIN, LEFT: Primary | ICD-10-CM

## 2022-09-27 PROCEDURE — 97110 THERAPEUTIC EXERCISES: CPT | Performed by: PHYSICAL THERAPIST

## 2022-09-27 PROCEDURE — 97140 MANUAL THERAPY 1/> REGIONS: CPT | Performed by: PHYSICAL THERAPIST

## 2022-09-27 NOTE — PROGRESS NOTES
Daily Note     Today's date: 2022  Patient name: Radha Logan  : 1980  MRN: [id number]  Referring provider: Sam Paget, MD  Dx:   Encounter Diagnosis     ICD-10-CM    1  Arm pain, left  M79 602                   Subjective: Pt reports feeling a little better since last session  Objective: See treatment diary below      Assessment: Tolerated treatment well  He noted some tightness in bicep, but no increased pain with exercise  Exercise continues to be challenging for patient, progress as see fit  Some tightness noted in shoulder IR, but noted relief with stretches today  Patient would benefit from continued PT to address current limitations  Plan: Continue per plan of care  Initial Evaluation Date:  09/15/22  Compliance 9/15  9/19  9/27                 Visit Number 1  2  3                 Re-Eval  IE                 [facility]   Foto Captured Y                                9/15  9/19  9/27                 Manual                       L shoulder ROM/elbow ROM                       STM L bicep    post RTC  post RTC 10'                                         Ther-Ex                       pulleys -    -                 UBE -    3'/3'                 Bicep stretch HEP   4x20"                 triceps stretch HEP                     Bicep curl TB HEP                     Triceps ext  On machine -                     Sleeper stretch  6x10" 6 x10"          No monies HEP rtb x20  red 3x12                 Wall angels  -    nv                 Pro/sup   With weight HEP                     Supine tband 2 way retraction  rtb x20 3x12 red          tband er  gtb 3x12 Green 3x15          tband rows low  gtb 2x15 Green 2x15          tband high rows  gtb 2x15 Green 2x15                                               Neuro Re-Ed                                                                                                                                               Ther-Act                                                                                                Modalities                       IFC/CP 13'    10' post CP only

## 2022-09-29 ENCOUNTER — OFFICE VISIT (OUTPATIENT)
Dept: PHYSICAL THERAPY | Facility: CLINIC | Age: 42
End: 2022-09-29
Payer: COMMERCIAL

## 2022-09-29 DIAGNOSIS — M79.602 ARM PAIN, LEFT: Primary | ICD-10-CM

## 2022-09-29 PROCEDURE — 97110 THERAPEUTIC EXERCISES: CPT | Performed by: PHYSICAL THERAPIST

## 2022-09-29 PROCEDURE — 97140 MANUAL THERAPY 1/> REGIONS: CPT | Performed by: PHYSICAL THERAPIST

## 2022-09-29 NOTE — PROGRESS NOTES
Daily Note     Today's date: 2022  Patient name: Erasmo Foster  : 1980  MRN: [id number]  Referring provider: Stephany Christian MD  Dx:   Encounter Diagnosis     ICD-10-CM    1  Arm pain, left  M79 602                   Subjective: Pt notes that he was able to do a little more at work today, is feeling better each day  Objective: See treatment diary below      Assessment: Tolerated treatment well  Added wall angles and increased resistance today, he was able to perform with some difficulty  Pt's symptoms seem to be improving, continue to progress in future sessions as see fit  Patient demonstrated fatigue post treatment and would benefit from continued PT to address current limitations  Plan: Continue per plan of care  Initial Evaluation Date:  09/15/22  Compliance 9/15  9/19  9/27  9/29               Visit Number 1  2  3  4               Re-Eval  IE      -           [facility] [place] [place]   Foto Captured Y      -                          9/15  9/19  9/27  9/29               Manual                       L shoulder ROM/elbow ROM                       STM L bicep    post RTC  post RTC 10'  post RTC 10'                                       Ther-Ex                       pulleys -    -                 UBE -    3'/3'  3'/3'               Bicep stretch HEP   4x20"  4x20"               triceps stretch HEP                     Bicep curl TB HEP                     Triceps ext  On machine -                     Sleeper stretch  6x10" 6 x10" 10"x 6         No monies HEP rtb x20  red 3x12  green 2x10               Wall angels  -    nv  2x10               Pro/sup   With weight HEP                     Supine tband 2 way retraction  rtb x20 3x12 red np         tband er  gtb 3x12 Green 3x15 green 3x15         tband rows low  gtb 2x15 Green 2x15 green 2x15         tband high rows  gtb 2x15 Green 2x15 green 3x15                                              Neuro Re-Ed                                                                                                                                               Ther-Act                                                                                                Modalities                       IFC/CP 13'    10' post CP only 10' post CP

## 2022-10-03 ENCOUNTER — OFFICE VISIT (OUTPATIENT)
Dept: PHYSICAL THERAPY | Facility: CLINIC | Age: 42
End: 2022-10-03
Payer: COMMERCIAL

## 2022-10-03 DIAGNOSIS — M79.602 ARM PAIN, LEFT: Primary | ICD-10-CM

## 2022-10-03 PROCEDURE — 97110 THERAPEUTIC EXERCISES: CPT | Performed by: PHYSICAL THERAPIST

## 2022-10-03 NOTE — PROGRESS NOTES
Daily Note     Today's date: 10/3/2022  Patient name: Luh Owens  : 1980  MRN: [id number]  Referring provider: Scottie Moncada MD  Dx:   Encounter Diagnosis     ICD-10-CM    1  Arm pain, left  M79 602                   Subjective: shoulder feeling pretty good  Has not bothered him much during last day or so  Objective: See treatment diary below      Assessment: good mobility L shoulder passive IR  Added additional scapular control work today  Tolerated well  Progressed tband and fatigued  Plan: continue current POC     Initial Evaluation Date:  09/15/22  Compliance 9/15  9/19  9/27  9/29  10/3             Visit Number 1  2  3  4  5             Re-Eval  IE      -           [facility]   Foto [employer] Y      -                          9/15  9/19  9/27  9/29  10/3             Manual                       L shoulder ROM/elbow ROM                       STM L bicep    post RTC  post RTC 10'  post RTC 10'                                       Ther-Ex                       pulleys -    -                 UBE -    3'/3'  3'/3'  3'/3'             Bicep stretch HEP   4x20"  4x20"               triceps stretch HEP                     Bicep curl TB HEP                     Triceps ext  On machine -                     Sleeper stretch  6x10" 6 x10" 10"x 6 10"x6        No monies HEP rtb x20  red 3x12  green 2x10  gtb 2x10             Wall angels  -    nv  2x10               Pro/sup   With weight HEP                     Supine tband 2 way retraction  rtb x20 3x12 red np gtb x20 ea        tband er  gtb 3x12 Green 3x15 green 3x15 btb 3x15        tband rows low  gtb 2x15 Green 2x15 green 2x15 btb 2x15        tband high rows  gtb 2x15 Green 2x15 green 3x15 btb 2x15                     Prone horiz abd     Neutral and ER 2x10 2#        Prone scap     2# 2x10                                             Neuro Re-Ed                                                                                                                                               Ther-Act                                                                                                Modalities                       IFC/CP 13'    10' post CP only 10' post CP

## 2022-10-05 ENCOUNTER — APPOINTMENT (OUTPATIENT)
Dept: PHYSICAL THERAPY | Facility: CLINIC | Age: 42
End: 2022-10-05

## 2022-10-11 ENCOUNTER — OFFICE VISIT (OUTPATIENT)
Dept: PHYSICAL THERAPY | Facility: CLINIC | Age: 42
End: 2022-10-11
Payer: COMMERCIAL

## 2022-10-11 DIAGNOSIS — M79.602 ARM PAIN, LEFT: Primary | ICD-10-CM

## 2022-10-11 PROCEDURE — 97110 THERAPEUTIC EXERCISES: CPT | Performed by: PHYSICAL THERAPIST

## 2022-10-11 NOTE — PROGRESS NOTES
Daily Note     Today's date: 10/11/2022  Patient name: Alex Knight  : 1980  MRN: [id number]  Referring provider: Santana Rivera MD  Dx:   Encounter Diagnosis     ICD-10-CM    1  Arm pain, left  M79 602                   Subjective: Pt notes that his arm continues to improve  States that he has not had any pain and has been working without any limitations  Objective: See treatment diary below      Assessment: Tolerated treatment well  Pt did well with all exercise, noted no increased pain in L UE  Discussed with pt possible DC nv pending that he continues to improve, pt agreeable with plan  Patient exhibited good technique with therapeutic exercises and would benefit from continued PT to address current limitations  Plan: Continue per plan of care  Initial Evaluation Date:  09/15/22  Compliance 9/15  9/19  9/27  9/29  10/3  10/11           Visit Number 1  2  3  4  5  6           Re-Eval  IE      -    -       [facility]   Foto Captured Y      -    Y                      9/15  9/19  9/27  9/29  10/3  10/11           Manual                       L shoulder ROM/elbow ROM                       STM L bicep    post RTC  post RTC 10'  post RTC 10'    -                                   Ther-Ex                       pulleys -    -                 UBE -    3'/3'  3'/3'  3'/3'  4'/4' L2           Bicep stretch HEP   4x20"  4x20"               triceps stretch HEP                     Bicep curl TB HEP                     Triceps ext  On machine -                     Sleeper stretch  6x10" 6 x10" 10"x 6 10"x6 10" x 6       No monies HEP rtb x20  red 3x12  green 2x10  gtb 2x10  blue 2x10            Wall angels  -    nv  2x10    2x10           Pro/sup   With weight HEP                     Supine tband 2 way retraction  rtb x20 3x12 red np gtb x20 ea Green TB 20x ea       tband er  gtb 3x12 Green 3x15 green 3x15 btb 3x15 BTB 3x15       tband rows low  gtb 2x15 Green 2x15 green 2x15 btb 2x15 BTB 2x15 tband high rows  gtb 2x15 Green 2x15 green 3x15 btb 2x15 Blue TB 2x15                    Prone horiz abd     Neutral and ER 2x10 2# neutral and ER 2x10 #2       Prone scap     2# 2x10 #2 2x10                                             Neuro Re-Ed                                                                                                                                               Ther-Act                                                                                                Modalities                       IFC/CP 13'    10' post CP only 10' post CP    -

## 2022-10-13 ENCOUNTER — OFFICE VISIT (OUTPATIENT)
Dept: PHYSICAL THERAPY | Facility: CLINIC | Age: 42
End: 2022-10-13
Payer: COMMERCIAL

## 2022-10-13 DIAGNOSIS — M79.602 ARM PAIN, LEFT: Primary | ICD-10-CM

## 2022-10-13 PROCEDURE — 97110 THERAPEUTIC EXERCISES: CPT | Performed by: PHYSICAL THERAPIST

## 2022-10-13 NOTE — PROGRESS NOTES
Daily Note/ DC Summary     Today's date: 10/13/2022  Patient name: Jovon Lane  : 1980  MRN: [id number]  Referring provider: Idris Vee MD  Dx:   Encounter Diagnosis     ICD-10-CM    1  Arm pain, left  M79 602                   Subjective: Pt notes that he continues to feel good with no issues  Is ready to DC today  Objective: See treatment diary below      Assessment: Tolerated treatment well  Pt was able to perform all exercise with little difficulty  Pt reports no increased pain levels and able to perform all work specific tasks  At this time, pt is DC from formal PT, will call the clinic with any future questions or concerns  Plan: Pt to be DC'd from formal PT  Initial Evaluation Date:  09/15/22  Compliance 9/15  9/19  9/27  9/29  10/3  10/11  10/13         Visit Number 1  2  3  4  5  6  7         Re-Eval  IE      -    -  -     [facility]   Foto Captured Y      -    Y  -                    9/15  9/19  9/27  9/29  10/3  10/11  10/13         Manual                       L shoulder ROM/elbow ROM                       STM L bicep    post RTC  post RTC 10'  post RTC 10'    -                                   Ther-Ex                       pulleys -    -                 UBE -    3'/3'  3'/3'  3'/3'  4'/4' L2  -         Bicep stretch HEP   4x20"  4x20"               triceps stretch HEP                     Bicep curl TB HEP                     Triceps ext  On machine -                     Sleeper stretch  6x10" 6 x10" 10"x 6 10"x6 10" x 6 10"x6      No monies HEP rtb x20  red 3x12  green 2x10  gtb 2x10  blue 2x10   blue 2x10         Wall angels  -    nv  2x10    2x10  2x10         Pro/sup   With weight HEP                     Supine tband 2 way retraction  rtb x20 3x12 red np gtb x20 ea Green TB 20x ea Blue TB 2x10 ea      tband er  gtb 3x12 Green 3x15 green 3x15 btb 3x15 BTB 3x15 BTB 3x10       tband rows low  gtb 2x15 Green 2x15 green 2x15 btb 2x15 BTB 2x15 Black 3x10      tband high rows  gtb 2x15 Green 2x15 green 3x15 btb 2x15 Blue TB 2x15 Black 3x10                   Prone horiz abd     Neutral and ER 2x10 2# neutral and ER 2x10 #2 Neutral and ER 2x10 #2      Prone scap     2# 2x10 #2 2x10  #2 2x10                                            Neuro Re-Ed                                                                                                                                               Ther-Act                                                                                                Modalities                       IFC/CP 13'    10' post CP only 10' post CP    -

## 2023-09-08 ENCOUNTER — OFFICE VISIT (OUTPATIENT)
Dept: URGENT CARE | Facility: CLINIC | Age: 43
End: 2023-09-08
Payer: COMMERCIAL

## 2023-09-08 VITALS
HEIGHT: 67 IN | HEART RATE: 88 BPM | WEIGHT: 173 LBS | TEMPERATURE: 98.1 F | RESPIRATION RATE: 20 BRPM | OXYGEN SATURATION: 97 % | BODY MASS INDEX: 27.15 KG/M2 | SYSTOLIC BLOOD PRESSURE: 116 MMHG | DIASTOLIC BLOOD PRESSURE: 69 MMHG

## 2023-09-08 DIAGNOSIS — J20.9 ACUTE BRONCHITIS, UNSPECIFIED ORGANISM: Primary | ICD-10-CM

## 2023-09-08 LAB
SARS-COV-2 AG UPPER RESP QL IA: NEGATIVE
VALID CONTROL: NORMAL

## 2023-09-08 PROCEDURE — G0382 LEV 3 HOSP TYPE B ED VISIT: HCPCS

## 2023-09-08 PROCEDURE — 87811 SARS-COV-2 COVID19 W/OPTIC: CPT

## 2023-09-08 RX ORDER — ALBUTEROL SULFATE 90 UG/1
2 AEROSOL, METERED RESPIRATORY (INHALATION) EVERY 6 HOURS PRN
Qty: 8.5 G | Refills: 0 | Status: SHIPPED | OUTPATIENT
Start: 2023-09-08

## 2023-09-08 RX ORDER — AZITHROMYCIN 250 MG/1
TABLET, FILM COATED ORAL
Qty: 6 TABLET | Refills: 0 | Status: SHIPPED | OUTPATIENT
Start: 2023-09-08 | End: 2023-09-12

## 2023-09-08 RX ORDER — PREDNISONE 10 MG/1
TABLET ORAL
Qty: 21 TABLET | Refills: 0 | Status: SHIPPED | OUTPATIENT
Start: 2023-09-08

## 2023-09-08 NOTE — PROGRESS NOTES
[facility]        NAME: Willis Liang is a 37 y.o. male  : 1980    MRN: [id number]  DATE: 2023  TIME: 2:47 PM    Assessment and Plan   Acute bronchitis, unspecified organism [J20.9]  1. Acute bronchitis, unspecified organism  Poct Covid 19 Rapid Antigen Test    azithromycin (ZITHROMAX) 250 mg tablet    albuterol (ProAir HFA) 90 mcg/act inhaler    predniSONE 10 mg tablet        Discussed problem with patient. COVID-negative. Symptoms consistent with acute bronchitis and prescribing Z-John, refilling albuterol inhaler, and prednisone for this issue. Tylenol ibuprofen for pain. Follow-up with PCP if symptoms not improve or if the ER symptoms worsen. Patient Instructions       Follow up with PCP in 3-5 days. Proceed to  ER if symptoms worsen. Chief Complaint     Chief Complaint   Patient presents with   • Cold Like Symptoms     Cough, chest tightness when coughing and wheezing started 4 days ago progressively getting worse. History of Present Illness       Cough, chest tightness when coughing and wheezing started 4 days ago progressively getting worse. Denies any fevers or chills and is eating and drinking normally. Has not been using anything for symptoms. Review of Systems   Review of Systems   Constitutional: Negative for appetite change, chills, fatigue and fever. HENT: Positive for congestion. Negative for ear pain, postnasal drip, rhinorrhea, sinus pressure, sinus pain and sore throat. Respiratory: Positive for cough, chest tightness and wheezing. Negative for shortness of breath and stridor. Cardiovascular: Negative for chest pain and palpitations.          Current Medications       Current Outpatient Medications:   •  albuterol (ProAir HFA) 90 mcg/act inhaler, Inhale 2 puffs every 6 (six) hours as needed for wheezing, Disp: 8.5 g, Rfl: 0  •  azithromycin (ZITHROMAX) 250 mg tablet, Take 2 tablets today then 1 tablet daily x 4 days, Disp: 6 tablet, Rfl: 0  •  FLUoxetine (PROzac) 20 mg capsule, Take 20 mg by mouth daily, Disp: , Rfl: 5  •  predniSONE 10 mg tablet, Take 6 pills on day 1, take 5 pills on day 2, take 4 pills on day 3, take 3 pills on day 4, take 2 pills on day 5, take 1 pill a day 6., Disp: 21 tablet, Rfl: 0  •  benzonatate (TESSALON PERLES) 100 mg capsule, Take 1 capsule (100 mg total) by mouth every 8 (eight) hours (Patient not taking: Reported on 9/14/2022), Disp: 21 capsule, Rfl: 0  •  levalbuterol (XOPENEX HFA) 45 mcg/act inhaler, Inhale 1 puff (Patient not taking: Reported on 9/8/2023), Disp: , Rfl:   •  meloxicam (MOBIC) 7.5 mg tablet, , Disp: , Rfl:   •  montelukast (SINGULAIR) 10 mg tablet, , Disp: , Rfl:     Current Allergies     Allergies as of 09/08/2023   • (No Known Allergies)            The following portions of the patient's history were reviewed and updated as appropriate: allergies, current medications, past family history, past medical history, past social history, past surgical history and problem list.     Past Medical History:   Diagnosis Date   • Depression    • Head injury        Past Surgical History:   Procedure Laterality Date   • BACK SURGERY         Family History   Problem Relation Age of Onset   • No Known Problems Mother    • Atrial fibrillation Father    • Diabetes Father    • Hypertension Father          Medications have been verified. Objective   /69   Pulse 88   Temp 98.1 °F (36.7 °C)   Resp 20   Ht 5' 7" (1.702 m)   Wt 78.5 kg (173 lb)   SpO2 97%   BMI 27.10 kg/m²        Physical Exam     Physical Exam  Vitals and nursing note reviewed. Constitutional:       General: He is not in acute distress. Appearance: Normal appearance. He is normal weight. He is not ill-appearing, toxic-appearing or diaphoretic. HENT:      Head: Normocephalic. Right Ear: Tympanic membrane, ear canal and external ear normal. There is no impacted cerumen.       Left Ear: Tympanic membrane, ear canal and external ear normal. There is no impacted cerumen. Nose: Congestion present. No rhinorrhea. Mouth/Throat:      Mouth: Mucous membranes are moist.      Pharynx: Oropharynx is clear. No oropharyngeal exudate or posterior oropharyngeal erythema. Eyes:      General:         Right eye: No discharge. Left eye: No discharge. Extraocular Movements: Extraocular movements intact. Conjunctiva/sclera: Conjunctivae normal.      Pupils: Pupils are equal, round, and reactive to light. Neck:      Vascular: No carotid bruit. Cardiovascular:      Rate and Rhythm: Normal rate and regular rhythm. Pulses: Normal pulses. Heart sounds: Normal heart sounds. No murmur heard. No friction rub. No gallop. Pulmonary:      Effort: Pulmonary effort is normal. No respiratory distress. Breath sounds: Normal breath sounds. No stridor. No wheezing, rhonchi or rales. Comments: Speaking in full sentences without difficulty   Chest:      Chest wall: No tenderness. Musculoskeletal:      Cervical back: Normal range of motion and neck supple. No rigidity or tenderness. Lymphadenopathy:      Cervical: No cervical adenopathy. Neurological:      Mental Status: He is alert.

## 2023-09-22 ENCOUNTER — OFFICE VISIT (OUTPATIENT)
Dept: URGENT CARE | Facility: CLINIC | Age: 43
End: 2023-09-22
Payer: COMMERCIAL

## 2023-09-22 VITALS
WEIGHT: 173 LBS | OXYGEN SATURATION: 98 % | SYSTOLIC BLOOD PRESSURE: 115 MMHG | HEART RATE: 84 BPM | TEMPERATURE: 98.3 F | BODY MASS INDEX: 27.15 KG/M2 | HEIGHT: 67 IN | DIASTOLIC BLOOD PRESSURE: 72 MMHG | RESPIRATION RATE: 18 BRPM

## 2023-09-22 DIAGNOSIS — J40 SINOBRONCHITIS: Primary | ICD-10-CM

## 2023-09-22 DIAGNOSIS — J32.9 SINOBRONCHITIS: Primary | ICD-10-CM

## 2023-09-22 PROCEDURE — G0382 LEV 3 HOSP TYPE B ED VISIT: HCPCS

## 2023-09-22 RX ORDER — AMOXICILLIN AND CLAVULANATE POTASSIUM 875; 125 MG/1; MG/1
1 TABLET, FILM COATED ORAL EVERY 12 HOURS SCHEDULED
Qty: 14 TABLET | Refills: 0 | Status: SHIPPED | OUTPATIENT
Start: 2023-09-22 | End: 2023-09-29

## 2023-09-22 NOTE — PATIENT INSTRUCTIONS
Take antibiotic as prescribed  Can use Tessalon Perles and albuterol inhaler as previously prescribed  Continue with supportive measures, OTC Tylenol/Ibuprofen, nasal decongestants, and cough suppressants   Cool mist humidifiers, throat lozenges, increased fluid intake and rest   Follow up with PCP in 3-5 days  Present to ER if symptoms worsen     Acute Bronchitis   AMBULATORY CARE:   Acute bronchitis  is swelling and irritation in your lungs. It is usually caused by a virus and most often happens in the winter. Bronchitis may also be caused by bacteria or by a chemical irritant, such as smoke. Common symptoms:   Cough that lasts up to 3 weeks    Runny or stuffy nose    Hoarseness, sore throat    Fever    Feeling more tired than usual, and body aches    Wheezing or pain when you breathe or cough    Seek care immediately if:   You cough up blood. Your lips or fingernails turn blue. You feel like you are not getting enough air when you breathe. Call your doctor if:   Your symptoms do not go away or get worse, even after treatment. Your cough does not get better within 4 weeks. You have questions or concerns about your condition or care. Medicines: You may need any of the following:  Cough suppressants  decrease your urge to cough. Decongestants  help loosen mucus in your lungs and make it easier to cough up. This can help you breathe easier. Inhalers  may be given. Your healthcare provider may give you one or more inhalers to help you breathe easier and cough less. An inhaler gives you medicine to open your airways. Ask your healthcare provider to show you how to use your inhaler correctly. Antiviral medicine  treats infections caused by a virus. Antibiotics  may be given if your bronchitis is caused by bacteria or if you have lung condition. Acetaminophen  decreases pain and fever. It is available without a doctor's order. Ask how much to take and how often to take it.  Follow directions. Read the labels of all other medicines you are using to see if they also contain acetaminophen, or ask your doctor or pharmacist. Acetaminophen can cause liver damage if not taken correctly. NSAIDs  help decrease swelling and pain or fever. This medicine is available with or without a doctor's order. NSAIDs can cause stomach bleeding or kidney problems in certain people. If you take blood thinner medicine, always ask your healthcare provider if NSAIDs are safe for you. Always read the medicine label and follow directions. Self-care:   Drink liquids as directed. You may need to drink more liquids than usual to stay hydrated. Ask how much liquid to drink each day and which liquids are best for you. Use a cool mist humidifier. This increases air moisture in your home. This may make it easier for you to breathe and help decrease your cough. Get more rest.  Rest helps your body to heal. Slowly start to do more each day. Rest when you feel it is needed. Prevent acute bronchitis:       Ask about vaccines you may need. Get a flu vaccine each year as soon as recommended, usually in September or October. Ask your healthcare provider if you should also get a pneumonia or COVID-19 vaccine. Your healthcare provider can tell you if you should also get other vaccines, and when to get them. Prevent the spread of germs. You can decrease your risk for acute bronchitis and other illnesses by doing the following:    Wash your hands often with soap and water. Carry germ-killing hand lotion or gel with you. You can use the lotion or gel to clean your hands when soap and water are not available. Do not touch your eyes, nose, or mouth unless you have washed your hands first.    Always cover your mouth when you cough to prevent the spread of germs. It is best to cough into a tissue or your shirt sleeve instead of into your hand. Ask those around you to cover their mouths when they cough.     Try to avoid people who have a cold or the flu. If you are sick, stay away from others as much as possible. Avoid irritants in the air. Avoid chemicals, fumes, and dust. Wear a face mask if you must work around dust or fumes. Stay inside on days when air pollution levels are high. If you have allergies, stay inside when pollen counts are high. Do not use aerosol products, such as spray-on deodorant, bug spray, and hair spray. Do not smoke or be around others who are smoking. Nicotine and other chemicals in cigarettes and cigars can cause lung damage. Ask your healthcare provider for information if you currently smoke and need help to quit. E-cigarettes or smokeless tobacco still contain nicotine. Talk to your healthcare provider before you use these products. Follow up with your doctor as directed:  Write down questions you have so you will remember to ask them during your follow-up visits. © Copyright Malissa Hernandez 2023 Information is for End User's use only and may not be sold, redistributed or otherwise used for commercial purposes. The above information is an  only. It is not intended as medical advice for individual conditions or treatments. Talk to your doctor, nurse or pharmacist before following any medical regimen to see if it is safe and effective for you. Sinusitis   AMBULATORY CARE:   Sinusitis  is inflammation or infection of your sinuses. Sinusitis is most often caused by a virus. Acute sinusitis may last up to 12 weeks. Chronic sinusitis lasts longer than 12 weeks. Recurrent sinusitis means you have 4 or more infections in 1 year.         Common signs and symptoms:   Fever    Pain, pressure, redness, or swelling around the forehead, cheeks, or eyes    Thick yellow or green discharge from your nose    Tenderness when you touch your face over your sinuses    Dry cough that happens mostly at night or when you lie down    Headache and face pain that is worse when you lean forward    Tooth pain, or pain when you chew    Seek care immediately if:   You have trouble breathing or wheezing that is getting worse. You have a stiff neck, a fever, or a bad headache. You cannot open your eye. Your eyeball bulges out or you cannot move your eye. You are more sleepy than normal, or you notice changes in your ability to think, move, or talk. You have swelling of your forehead or scalp. Call your doctor if:   You have vision changes, such as double vision. Your eye and eyelid are red, swollen, and painful. Your symptoms do not improve or go away after 10 days. You have nausea and are vomiting. Your nose is bleeding. You have questions or concerns about your condition or care. Medicines: Your symptoms may go away on their own. Your healthcare provider may recommend watchful waiting for up to 10 days before starting antibiotics. You may need any of the following:  Acetaminophen  decreases pain and fever. It is available without a doctor's order. Ask how much to take and how often to take it. Follow directions. Read the labels of all other medicines you are using to see if they also contain acetaminophen, or ask your doctor or pharmacist. Acetaminophen can cause liver damage if not taken correctly. NSAIDs , such as ibuprofen, help decrease swelling, pain, and fever. This medicine is available with or without a doctor's order. NSAIDs can cause stomach bleeding or kidney problems in certain people. If you take blood thinner medicine, always ask your healthcare provider if NSAIDs are safe for you. Always read the medicine label and follow directions. Nasal steroid sprays  may help decrease inflammation in your nose and sinuses. Decongestants  help reduce swelling and drain mucus in the nose and sinuses. They may help you breathe easier. Antihistamines  help dry mucus in the nose and relieve sneezing.      Antibiotics  help treat or prevent a bacterial infection. Self-care:   Rinse your sinuses as directed. Use a sinus rinse device to rinse your nasal passages with a saline (salt water) solution or distilled water. Do not use tap water. This will help thin the mucus in your nose and rinse away pollen and dirt. It will also help reduce swelling so you can breathe normally. Use a humidifier  to increase air moisture in your home. This may make it easier for you to breathe and help decrease your cough. Sleep with your head elevated. Place an extra pillow under your head before you go to sleep to help your sinuses drain. Drink liquids as directed. Ask your healthcare provider how much liquid to drink each day and which liquids are best for you. Liquids will thin the mucus in your nose and help it drain. Avoid drinks that contain alcohol or caffeine. Do not smoke, and avoid secondhand smoke. Nicotine and other chemicals in cigarettes and cigars can make your symptoms worse. Ask your healthcare provider for information if you currently smoke and need help to quit. E-cigarettes or smokeless tobacco still contain nicotine. Talk to your healthcare provider before you use these products. Prevent the spread of germs:   Wash your hands often with soap and water. Wash your hands after you use the bathroom, change a child's diaper, or sneeze. Wash your hands before you prepare or eat food. Stay away from people who are sick. Some germs spread easily and quickly through contact. Follow up with your doctor as directed: You may be referred to an ear, nose, and throat specialist. Write down your questions so you remember to ask them during your visits. © Copyright Loletta Gosselin 2023 Information is for End User's use only and may not be sold, redistributed or otherwise used for commercial purposes. The above information is an  only. It is not intended as medical advice for individual conditions or treatments.  Talk to your doctor, nurse or pharmacist before following any medical regimen to see if it is safe and effective for you.

## 2023-09-22 NOTE — PROGRESS NOTES
[facility]        NAME: Kirsten oJnes is a 37 y.o. male  : 1980    MRN: [id number]  DATE: 2023  TIME: 6:18 PM    Assessment and Plan   Sinobronchitis [J32.9, J40]  1. Sinobronchitis  amoxicillin-clavulanate (AUGMENTIN) 875-125 mg per tablet        Written by Rudy BROWN, reviewed by myself. Lungs clear on PE and VSS. Given ongoing symptoms, will treat with Augmentin and encouraged continued supportive measures. Follow up with PCP in 3-5 days or proceed to emergency department for worsening symptoms. Patient verbalized understanding of instructions given. Patient Instructions     Patient Instructions     Take antibiotic as prescribed  Can use Tessalon Perles and albuterol inhaler as previously prescribed  Continue with supportive measures, OTC Tylenol/Ibuprofen, nasal decongestants, and cough suppressants   Cool mist humidifiers, throat lozenges, increased fluid intake and rest   Follow up with PCP in 3-5 days  Present to ER if symptoms worsen     Acute Bronchitis   AMBULATORY CARE:   Acute bronchitis  is swelling and irritation in your lungs. It is usually caused by a virus and most often happens in the winter. Bronchitis may also be caused by bacteria or by a chemical irritant, such as smoke. Common symptoms:   • Cough that lasts up to 3 weeks    • Runny or stuffy nose    • Hoarseness, sore throat    • Fever    • Feeling more tired than usual, and body aches    • Wheezing or pain when you breathe or cough    Seek care immediately if:   • You cough up blood. • Your lips or fingernails turn blue. • You feel like you are not getting enough air when you breathe. Call your doctor if:   • Your symptoms do not go away or get worse, even after treatment. • Your cough does not get better within 4 weeks. • You have questions or concerns about your condition or care. Medicines:   You may need any of the following:  • Cough suppressants  decrease your urge to cough. • Decongestants  help loosen mucus in your lungs and make it easier to cough up. This can help you breathe easier. • Inhalers  may be given. Your healthcare provider may give you one or more inhalers to help you breathe easier and cough less. An inhaler gives you medicine to open your airways. Ask your healthcare provider to show you how to use your inhaler correctly. • Antiviral medicine  treats infections caused by a virus. • Antibiotics  may be given if your bronchitis is caused by bacteria or if you have lung condition. • Acetaminophen  decreases pain and fever. It is available without a doctor's order. Ask how much to take and how often to take it. Follow directions. Read the labels of all other medicines you are using to see if they also contain acetaminophen, or ask your doctor or pharmacist. Acetaminophen can cause liver damage if not taken correctly. • NSAIDs  help decrease swelling and pain or fever. This medicine is available with or without a doctor's order. NSAIDs can cause stomach bleeding or kidney problems in certain people. If you take blood thinner medicine, always ask your healthcare provider if NSAIDs are safe for you. Always read the medicine label and follow directions. Self-care:   • Drink liquids as directed. You may need to drink more liquids than usual to stay hydrated. Ask how much liquid to drink each day and which liquids are best for you. • Use a cool mist humidifier. This increases air moisture in your home. This may make it easier for you to breathe and help decrease your cough. • Get more rest.  Rest helps your body to heal. Slowly start to do more each day. Rest when you feel it is needed. Prevent acute bronchitis:       • Ask about vaccines you may need. Get a flu vaccine each year as soon as recommended, usually in September or October. Ask your healthcare provider if you should also get a pneumonia or COVID-19 vaccine.  Your healthcare provider can tell you if you should also get other vaccines, and when to get them. • Prevent the spread of germs. You can decrease your risk for acute bronchitis and other illnesses by doing the following:    ? Wash your hands often with soap and water. Carry germ-killing hand lotion or gel with you. You can use the lotion or gel to clean your hands when soap and water are not available. ? Do not touch your eyes, nose, or mouth unless you have washed your hands first.    ? Always cover your mouth when you cough to prevent the spread of germs. It is best to cough into a tissue or your shirt sleeve instead of into your hand. Ask those around you to cover their mouths when they cough. ? Try to avoid people who have a cold or the flu. If you are sick, stay away from others as much as possible. • Avoid irritants in the air. Avoid chemicals, fumes, and dust. Wear a face mask if you must work around dust or fumes. Stay inside on days when air pollution levels are high. If you have allergies, stay inside when pollen counts are high. Do not use aerosol products, such as spray-on deodorant, bug spray, and hair spray. • Do not smoke or be around others who are smoking. Nicotine and other chemicals in cigarettes and cigars can cause lung damage. Ask your healthcare provider for information if you currently smoke and need help to quit. E-cigarettes or smokeless tobacco still contain nicotine. Talk to your healthcare provider before you use these products. Follow up with your doctor as directed:  Write down questions you have so you will remember to ask them during your follow-up visits. © Copyright Luis Davalos 2023 Information is for End User's use only and may not be sold, redistributed or otherwise used for commercial purposes. The above information is an  only. It is not intended as medical advice for individual conditions or treatments.  Talk to your doctor, nurse or pharmacist before following any medical regimen to see if it is safe and effective for you. Sinusitis   AMBULATORY CARE:   Sinusitis  is inflammation or infection of your sinuses. Sinusitis is most often caused by a virus. Acute sinusitis may last up to 12 weeks. Chronic sinusitis lasts longer than 12 weeks. Recurrent sinusitis means you have 4 or more infections in 1 year. Common signs and symptoms:   • Fever    • Pain, pressure, redness, or swelling around the forehead, cheeks, or eyes    • Thick yellow or green discharge from your nose    • Tenderness when you touch your face over your sinuses    • Dry cough that happens mostly at night or when you lie down    • Headache and face pain that is worse when you lean forward    • Tooth pain, or pain when you chew    Seek care immediately if:   • You have trouble breathing or wheezing that is getting worse. • You have a stiff neck, a fever, or a bad headache. • You cannot open your eye. • Your eyeball bulges out or you cannot move your eye. • You are more sleepy than normal, or you notice changes in your ability to think, move, or talk. • You have swelling of your forehead or scalp. Call your doctor if:   • You have vision changes, such as double vision. • Your eye and eyelid are red, swollen, and painful. • Your symptoms do not improve or go away after 10 days. • You have nausea and are vomiting. • Your nose is bleeding. • You have questions or concerns about your condition or care. Medicines: Your symptoms may go away on their own. Your healthcare provider may recommend watchful waiting for up to 10 days before starting antibiotics. You may need any of the following:  • Acetaminophen  decreases pain and fever. It is available without a doctor's order. Ask how much to take and how often to take it. Follow directions.  Read the labels of all other medicines you are using to see if they also contain acetaminophen, or ask your doctor or pharmacist. Acetaminophen can cause liver damage if not taken correctly. • NSAIDs , such as ibuprofen, help decrease swelling, pain, and fever. This medicine is available with or without a doctor's order. NSAIDs can cause stomach bleeding or kidney problems in certain people. If you take blood thinner medicine, always ask your healthcare provider if NSAIDs are safe for you. Always read the medicine label and follow directions. • Nasal steroid sprays  may help decrease inflammation in your nose and sinuses. • Decongestants  help reduce swelling and drain mucus in the nose and sinuses. They may help you breathe easier. • Antihistamines  help dry mucus in the nose and relieve sneezing. • Antibiotics  help treat or prevent a bacterial infection. Self-care:   • Rinse your sinuses as directed. Use a sinus rinse device to rinse your nasal passages with a saline (salt water) solution or distilled water. Do not use tap water. This will help thin the mucus in your nose and rinse away pollen and dirt. It will also help reduce swelling so you can breathe normally. • Use a humidifier  to increase air moisture in your home. This may make it easier for you to breathe and help decrease your cough. • Sleep with your head elevated. Place an extra pillow under your head before you go to sleep to help your sinuses drain. • Drink liquids as directed. Ask your healthcare provider how much liquid to drink each day and which liquids are best for you. Liquids will thin the mucus in your nose and help it drain. Avoid drinks that contain alcohol or caffeine. • Do not smoke, and avoid secondhand smoke. Nicotine and other chemicals in cigarettes and cigars can make your symptoms worse. Ask your healthcare provider for information if you currently smoke and need help to quit. E-cigarettes or smokeless tobacco still contain nicotine.  Talk to your healthcare provider before you use these products. Prevent the spread of germs:   • Wash your hands often with soap and water. Wash your hands after you use the bathroom, change a child's diaper, or sneeze. Wash your hands before you prepare or eat food. • Stay away from people who are sick. Some germs spread easily and quickly through contact. Follow up with your doctor as directed: You may be referred to an ear, nose, and throat specialist. Write down your questions so you remember to ask them during your visits. © Copyright Abi Ranks 2023 Information is for End User's use only and may not be sold, redistributed or otherwise used for commercial purposes. The above information is an  only. It is not intended as medical advice for individual conditions or treatments. Talk to your doctor, nurse or pharmacist before following any medical regimen to see if it is safe and effective for you. Chief Complaint     Chief Complaint   Patient presents with   • Cough     Still has cough and congestion from 9/8. Finished medication given last time and it hasnt gone away. Also has chest pressure on left side          History of Present Illness       ERMA is a 37year old male with no significant past medical history presenting to the urgent care with cough and congestion since 9/4. Patient was seen at urgent care facility 9/8 and treated with azithromycin, albuterol, and prednisone but the symptoms continue to persist. Patient notes nasal congestion, rhinorrhea, a productive cough with clear/yellowish mucus, and a slight sore throat secondary to coughing. He notes he feels more pressure in his lungs if he lays on his left side to sleep and notes some chest tightness. Patient denies fevers, chills, nausea, vomiting, bowel changes, myalgias, shortness of breath, and chest pain. He takes allegra daily and has tried vicks and a humidifier but nothing has made his symptoms better. Not using Tessalon Perles but using inhaler PRN. Review of Systems   Review of Systems   Constitutional: Negative for chills and fever. HENT: Positive for congestion, postnasal drip, rhinorrhea and sinus pressure. Negative for ear pain, sinus pain and sore throat. Eyes: Negative for discharge. Respiratory: Positive for cough. Negative for shortness of breath. Cardiovascular: Negative for chest pain and palpitations. Gastrointestinal: Positive for abdominal pain. Negative for constipation, diarrhea, nausea and vomiting. Musculoskeletal: Negative for myalgias. Skin: Negative for color change. Neurological: Negative for headaches.          Current Medications       Current Outpatient Medications:   •  albuterol (ProAir HFA) 90 mcg/act inhaler, Inhale 2 puffs every 6 (six) hours as needed for wheezing, Disp: 8.5 g, Rfl: 0  •  amoxicillin-clavulanate (AUGMENTIN) 875-125 mg per tablet, Take 1 tablet by mouth every 12 (twelve) hours for 7 days, Disp: 14 tablet, Rfl: 0  •  FLUoxetine (PROzac) 20 mg capsule, Take 20 mg by mouth daily, Disp: , Rfl: 5  •  predniSONE 10 mg tablet, Take 6 pills on day 1, take 5 pills on day 2, take 4 pills on day 3, take 3 pills on day 4, take 2 pills on day 5, take 1 pill a day 6., Disp: 21 tablet, Rfl: 0  •  benzonatate (TESSALON PERLES) 100 mg capsule, Take 1 capsule (100 mg total) by mouth every 8 (eight) hours (Patient not taking: Reported on 9/14/2022), Disp: 21 capsule, Rfl: 0  •  levalbuterol (XOPENEX HFA) 45 mcg/act inhaler, Inhale 1 puff (Patient not taking: Reported on 9/8/2023), Disp: , Rfl:   •  meloxicam (MOBIC) 7.5 mg tablet, , Disp: , Rfl:   •  montelukast (SINGULAIR) 10 mg tablet, , Disp: , Rfl:     Current Allergies     Allergies as of 09/22/2023   • (No Known Allergies)            The following portions of the patient's history were reviewed and updated as appropriate: allergies, current medications, past family history, past medical history, past social history, past surgical history and problem list. Past Medical History:   Diagnosis Date   • Depression    • Head injury        Past Surgical History:   Procedure Laterality Date   • BACK SURGERY         Family History   Problem Relation Age of Onset   • No Known Problems Mother    • Atrial fibrillation Father    • Diabetes Father    • Hypertension Father          Medications have been verified. Objective   /72   Pulse 84   Temp 98.3 °F (36.8 °C)   Resp 18   Ht 5' 7" (1.702 m)   Wt 78.5 kg (173 lb)   SpO2 98%   BMI 27.10 kg/m²   No LMP for male patient. Physical Exam     Physical Exam  Vitals and nursing note reviewed. Constitutional:       General: He is not in acute distress. Appearance: Normal appearance. He is not ill-appearing or toxic-appearing. HENT:      Head: Normocephalic and atraumatic. Right Ear: Tympanic membrane, ear canal and external ear normal.      Left Ear: Tympanic membrane, ear canal and external ear normal.      Nose: Congestion and rhinorrhea present. Mouth/Throat:      Mouth: Mucous membranes are moist.      Pharynx: Uvula midline. Posterior oropharyngeal erythema present. No oropharyngeal exudate. Tonsils: No tonsillar exudate. Comments: Some cobblestoning and erythema on posterior pharynx congruent with postnasal drip. Eyes:      Conjunctiva/sclera: Conjunctivae normal.   Cardiovascular:      Rate and Rhythm: Normal rate and regular rhythm. Pulses: Normal pulses. Heart sounds: Normal heart sounds. No murmur heard. No friction rub. No gallop. Pulmonary:      Effort: Pulmonary effort is normal.      Breath sounds: Normal breath sounds. No wheezing, rhonchi or rales. Abdominal:      General: Bowel sounds are normal.      Palpations: Abdomen is soft. Tenderness: There is no abdominal tenderness. Musculoskeletal:      Cervical back: Neck supple. Lymphadenopathy:      Cervical: No cervical adenopathy. Skin:     General: Skin is warm and dry.    Neurological: Mental Status: He is alert and oriented to person, place, and time. Sensory: Sensation is intact. Motor: Motor function is intact. Gait: Gait is intact.    Psychiatric:         Mood and Affect: Mood normal.         Behavior: Behavior normal.

## 2025-07-05 ENCOUNTER — OFFICE VISIT (OUTPATIENT)
Dept: URGENT CARE | Facility: CLINIC | Age: 45
End: 2025-07-05
Payer: COMMERCIAL

## 2025-07-05 VITALS
TEMPERATURE: 98.1 F | HEART RATE: 102 BPM | RESPIRATION RATE: 18 BRPM | SYSTOLIC BLOOD PRESSURE: 111 MMHG | OXYGEN SATURATION: 98 % | WEIGHT: 170 LBS | HEIGHT: 67 IN | BODY MASS INDEX: 26.68 KG/M2 | DIASTOLIC BLOOD PRESSURE: 72 MMHG

## 2025-07-05 DIAGNOSIS — J02.9 SORE THROAT: Primary | ICD-10-CM

## 2025-07-05 LAB — S PYO AG THROAT QL: NEGATIVE

## 2025-07-05 PROCEDURE — 87070 CULTURE OTHR SPECIMN AEROBIC: CPT

## 2025-07-05 PROCEDURE — G0383 LEV 4 HOSP TYPE B ED VISIT: HCPCS

## 2025-07-05 PROCEDURE — S9083 URGENT CARE CENTER GLOBAL: HCPCS

## 2025-07-05 PROCEDURE — 87880 STREP A ASSAY W/OPTIC: CPT

## 2025-07-05 RX ORDER — AMOXICILLIN 500 MG/1
500 CAPSULE ORAL EVERY 12 HOURS SCHEDULED
Qty: 20 CAPSULE | Refills: 0 | Status: SHIPPED | OUTPATIENT
Start: 2025-07-05 | End: 2025-07-15

## 2025-07-05 NOTE — PROGRESS NOTES
[facility]  Name: Hussain Coronado      : 1980      MRN: [id number]  Encounter Provider: Fito Mak PA-C  Encounter Date: 2025   Encounter department: [facility] [place]  :  Assessment & Plan  Sore throat    Orders:    POCT rapid ANTIGEN strepA    Throat culture    amoxicillin (AMOXIL) 500 mg capsule; Take 1 capsule (500 mg total) by mouth every 12 (twelve) hours for 10 days  Rapid strep negative however with lymphadenopathy, exudates, redness and swelling on tonsils with lack of cough starting based off of center criteria for empiric antibiotics.      Patient Instructions  Follow up with PCP in 3-5 days.  Proceed to  ER if symptoms worsen.    If tests are performed, our office will contact you with results only if changes need to made to the care plan discussed with you at the visit. [facility]    Chief Complaint:   Chief Complaint   Patient presents with    Sore Throat     Patient states he began with a sore throat on Thursday. Patient states he began B12 300mg and forgot he had a reaction to it in the past causing his tonsils to swell. Patient states he has white spots on his throat and redness. Patient states he feels as though he is swallowing glass. Denies fevers.      History of Present Illness   45-year-old male presenting with sore throat x 3 days.  Noticing spots on his tonsils up with redness and irritation.  Feels like he swallowing glass.  Denies any sick contacts.  Using Tylenol and salt water gargles for the pain.    Sore Throat   Associated symptoms include congestion. Pertinent negatives include no abdominal pain, coughing, diarrhea, ear pain, headaches, shortness of breath or vomiting.         Review of Systems   Constitutional:  Positive for appetite change. Negative for chills, fatigue and fever.   HENT:  Positive for congestion and sore throat. Negative for ear pain and rhinorrhea.    Respiratory:  Negative for  "cough and shortness of breath.    Cardiovascular:  Negative for chest pain and palpitations.   Gastrointestinal:  Negative for abdominal pain, diarrhea, nausea and vomiting.   Musculoskeletal:  Negative for arthralgias and myalgias.   Neurological:  Negative for light-headedness and headaches.     Past Medical History   Past Medical History[1]  Past Surgical History[2]  Family History[3]  he reports that he has never smoked. He has never used smokeless tobacco. He reports that he does not currently use alcohol. He reports that he does not currently use drugs.  Current Outpatient Medications   Medication Instructions    albuterol (ProAir HFA) 90 mcg/act inhaler 2 puffs, Inhalation, Every 6 hours PRN    amoxicillin (AMOXIL) 500 mg, Oral, Every 12 hours scheduled    benzonatate (TESSALON PERLES) 100 mg, Oral, Every 8 hours    FLUoxetine (PROZAC) 20 mg, Daily    levalbuterol (XOPENEX HFA) 45 mcg/act inhaler 1 puff    meloxicam (MOBIC) 7.5 mg tablet No dose, route, or frequency recorded.    montelukast (SINGULAIR) 10 mg tablet No dose, route, or frequency recorded.    predniSONE 10 mg tablet Take 6 pills on day 1, take 5 pills on day 2, take 4 pills on day 3, take 3 pills on day 4, take 2 pills on day 5, take 1 pill a day 6.   Allergies[4]     Objective   /72   Pulse 102   Temp 98.1 °F (36.7 °C) (Tympanic)   Resp 18   Ht 5' 7\" (1.702 m)   Wt 77.1 kg (170 lb)   SpO2 98%   BMI 26.63 kg/m²      Physical Exam  Vitals and nursing note reviewed.   Constitutional:       General: He is not in acute distress.     Appearance: He is normal weight.   HENT:      Head: Normocephalic and atraumatic.      Right Ear: Tympanic membrane, ear canal and external ear normal.      Left Ear: Tympanic membrane, ear canal and external ear normal.      Nose: Nose normal. No congestion.      Mouth/Throat:      Mouth: Mucous membranes are moist.      Pharynx: Posterior oropharyngeal erythema present. No oropharyngeal exudate.      " "Tonsils: Tonsillar exudate present. No tonsillar abscesses. 2+ on the right. 2+ on the left.     Eyes:      General:         Right eye: No discharge.         Left eye: No discharge.      Conjunctiva/sclera: Conjunctivae normal.      Pupils: Pupils are equal, round, and reactive to light.       Cardiovascular:      Rate and Rhythm: Normal rate and regular rhythm.      Pulses: Normal pulses.      Heart sounds: Normal heart sounds.   Pulmonary:      Effort: Pulmonary effort is normal. No respiratory distress.      Breath sounds: Normal breath sounds. No wheezing.   Abdominal:      General: Abdomen is flat. Bowel sounds are normal.      Tenderness: There is no abdominal tenderness.   Lymphadenopathy:      Cervical: No cervical adenopathy.     Skin:     General: Skin is warm.      Capillary Refill: Capillary refill takes less than 2 seconds.     Neurological:      General: No focal deficit present.      Mental Status: He is alert and oriented to person, place, and time.     Psychiatric:         Mood and Affect: Mood normal.         Behavior: Behavior normal.         Portions of the record may have been created with voice recognition software.  Occasional wrong word or \"sound a like\" substitutions may have occurred due to the inherent limitations of voice recognition software.  Read the chart carefully and recognize, using context, where substitutions have occurred.       [1]   Past Medical History:  Diagnosis Date    Depression     Head injury    [2]   Past Surgical History:  Procedure Laterality Date    BACK SURGERY     [3]   Family History  Problem Relation Name Age of Onset    No Known Problems Mother      Atrial fibrillation Father      Diabetes Father      Hypertension Father     [4] No Known Allergies    "

## 2025-07-08 ENCOUNTER — TELEPHONE (OUTPATIENT)
Dept: URGENT CARE | Facility: CLINIC | Age: 45
End: 2025-07-08

## 2025-07-08 LAB — BACTERIA THROAT CULT: NORMAL

## 2025-07-08 NOTE — TELEPHONE ENCOUNTER
Called to inform of negative test results and no longer needing to take antibiotics given negative throat culture.  Left voicemail to call back so we are able to disclose those results to him.

## 2025-07-10 ENCOUNTER — RESULTS FOLLOW-UP (OUTPATIENT)
Dept: URGENT CARE | Facility: CLINIC | Age: 45
End: 2025-07-10